# Patient Record
Sex: FEMALE | Race: BLACK OR AFRICAN AMERICAN | NOT HISPANIC OR LATINO | Employment: FULL TIME | URBAN - METROPOLITAN AREA
[De-identification: names, ages, dates, MRNs, and addresses within clinical notes are randomized per-mention and may not be internally consistent; named-entity substitution may affect disease eponyms.]

---

## 2020-05-13 ENCOUNTER — OFFICE VISIT (OUTPATIENT)
Dept: FAMILY MEDICINE CLINIC | Facility: CLINIC | Age: 30
End: 2020-05-13
Payer: COMMERCIAL

## 2020-05-13 VITALS
TEMPERATURE: 98.3 F | DIASTOLIC BLOOD PRESSURE: 84 MMHG | WEIGHT: 256 LBS | OXYGEN SATURATION: 98 % | SYSTOLIC BLOOD PRESSURE: 132 MMHG | BODY MASS INDEX: 43.71 KG/M2 | HEIGHT: 64 IN | HEART RATE: 58 BPM

## 2020-05-13 DIAGNOSIS — N92.6 ABNORMAL MENSTRUAL PERIODS: Primary | ICD-10-CM

## 2020-05-13 DIAGNOSIS — Z13.220 ENCOUNTER FOR SCREENING FOR LIPID DISORDER: ICD-10-CM

## 2020-05-13 DIAGNOSIS — Z13.29 SCREENING FOR THYROID DISORDER: ICD-10-CM

## 2020-05-13 DIAGNOSIS — Z13.1 ENCOUNTER FOR SCREENING FOR DIABETES MELLITUS: ICD-10-CM

## 2020-05-13 DIAGNOSIS — Z13.6 SCREENING FOR HYPERTENSION: ICD-10-CM

## 2020-05-13 DIAGNOSIS — Z13.0 SCREENING FOR IRON DEFICIENCY ANEMIA: ICD-10-CM

## 2020-05-13 DIAGNOSIS — E66.01 MORBID OBESITY WITH BMI OF 40.0-44.9, ADULT (HCC): ICD-10-CM

## 2020-05-13 PROCEDURE — 99203 OFFICE O/P NEW LOW 30 MIN: CPT | Performed by: FAMILY MEDICINE

## 2020-05-13 PROCEDURE — 3008F BODY MASS INDEX DOCD: CPT | Performed by: FAMILY MEDICINE

## 2020-05-13 PROCEDURE — 1036F TOBACCO NON-USER: CPT | Performed by: FAMILY MEDICINE

## 2020-05-26 ENCOUNTER — HOSPITAL ENCOUNTER (OUTPATIENT)
Dept: ULTRASOUND IMAGING | Facility: HOSPITAL | Age: 30
Discharge: HOME | End: 2020-05-26
Payer: COMMERCIAL

## 2020-05-26 DIAGNOSIS — N92.6 ABNORMAL MENSTRUAL PERIODS: ICD-10-CM

## 2020-05-26 PROCEDURE — 76856 US EXAM PELVIC COMPLETE: CPT

## 2020-05-26 PROCEDURE — 76830 TRANSVAGINAL US NON-OB: CPT

## 2020-06-03 ENCOUNTER — APPOINTMENT (OUTPATIENT)
Dept: LAB | Facility: HOSPITAL | Age: 30
End: 2020-06-03
Payer: COMMERCIAL

## 2020-06-03 DIAGNOSIS — Z13.29 SCREENING FOR THYROID DISORDER: ICD-10-CM

## 2020-06-03 DIAGNOSIS — Z13.6 SCREENING FOR HYPERTENSION: ICD-10-CM

## 2020-06-03 DIAGNOSIS — Z13.1 ENCOUNTER FOR SCREENING FOR DIABETES MELLITUS: ICD-10-CM

## 2020-06-03 DIAGNOSIS — E66.01 MORBID OBESITY WITH BMI OF 40.0-44.9, ADULT (HCC): ICD-10-CM

## 2020-06-03 DIAGNOSIS — Z13.0 SCREENING FOR IRON DEFICIENCY ANEMIA: ICD-10-CM

## 2020-06-03 DIAGNOSIS — N92.6 ABNORMAL MENSTRUAL PERIODS: ICD-10-CM

## 2020-06-03 DIAGNOSIS — Z13.220 ENCOUNTER FOR SCREENING FOR LIPID DISORDER: ICD-10-CM

## 2020-06-03 LAB
ALBUMIN SERPL BCP-MCNC: 3.6 G/DL (ref 3–5.2)
ALP SERPL-CCNC: 75 U/L (ref 43–122)
ALT SERPL W P-5'-P-CCNC: 17 U/L (ref 9–52)
ANION GAP SERPL CALCULATED.3IONS-SCNC: 9 MMOL/L (ref 5–14)
AST SERPL W P-5'-P-CCNC: 20 U/L (ref 14–36)
B-HCG SERPL-ACNC: <3 MIU/ML
BASOPHILS # BLD AUTO: 0 THOUSANDS/ΜL (ref 0–0.1)
BASOPHILS NFR BLD AUTO: 0 % (ref 0–1)
BILIRUB SERPL-MCNC: 0.4 MG/DL
BUN SERPL-MCNC: 13 MG/DL (ref 5–25)
CALCIUM SERPL-MCNC: 9.5 MG/DL (ref 8.4–10.2)
CHLORIDE SERPL-SCNC: 103 MMOL/L (ref 97–108)
CHOLEST SERPL-MCNC: 192 MG/DL
CO2 SERPL-SCNC: 26 MMOL/L (ref 22–30)
CREAT SERPL-MCNC: 0.79 MG/DL (ref 0.6–1.2)
EOSINOPHIL # BLD AUTO: 0.1 THOUSAND/ΜL (ref 0–0.4)
EOSINOPHIL NFR BLD AUTO: 3 % (ref 0–6)
ERYTHROCYTE [DISTWIDTH] IN BLOOD BY AUTOMATED COUNT: 16.7 %
FSH SERPL-ACNC: 4.3 MIU/ML
GFR SERPL CREATININE-BSD FRML MDRD: 115 ML/MIN/1.73SQ M
GLUCOSE P FAST SERPL-MCNC: 91 MG/DL (ref 70–99)
HCT VFR BLD AUTO: 37.4 % (ref 36–46)
HDLC SERPL-MCNC: 47 MG/DL
HGB BLD-MCNC: 12.4 G/DL (ref 12–16)
LDLC SERPL CALC-MCNC: 118 MG/DL
LH SERPL-ACNC: 2.6 MIU/ML
LYMPHOCYTES # BLD AUTO: 1.9 THOUSANDS/ΜL (ref 0.5–4)
LYMPHOCYTES NFR BLD AUTO: 34 % (ref 25–45)
MCH RBC QN AUTO: 27.7 PG (ref 26–34)
MCHC RBC AUTO-ENTMCNC: 33 G/DL (ref 31–36)
MCV RBC AUTO: 84 FL (ref 80–100)
MONOCYTES # BLD AUTO: 0.4 THOUSAND/ΜL (ref 0.2–0.9)
MONOCYTES NFR BLD AUTO: 7 % (ref 1–10)
NEUTROPHILS # BLD AUTO: 3.2 THOUSANDS/ΜL (ref 1.8–7.8)
NEUTS SEG NFR BLD AUTO: 57 % (ref 45–65)
PLATELET # BLD AUTO: 280 THOUSANDS/UL (ref 150–450)
PMV BLD AUTO: 8.8 FL (ref 8.9–12.7)
POTASSIUM SERPL-SCNC: 4.6 MMOL/L (ref 3.6–5)
PROLACTIN SERPL-MCNC: 12.5 NG/ML
PROT SERPL-MCNC: 6.8 G/DL (ref 5.9–8.4)
RBC # BLD AUTO: 4.47 MILLION/UL (ref 4–5.2)
SODIUM SERPL-SCNC: 138 MMOL/L (ref 137–147)
TESTOST SERPL-MCNC: 30 NG/DL
TRIGL SERPL-MCNC: 135 MG/DL
TSH SERPL DL<=0.05 MIU/L-ACNC: 2.43 UIU/ML (ref 0.47–4.68)
WBC # BLD AUTO: 5.7 THOUSAND/UL (ref 4.5–11)

## 2020-06-03 PROCEDURE — 83002 ASSAY OF GONADOTROPIN (LH): CPT

## 2020-06-03 PROCEDURE — 80061 LIPID PANEL: CPT

## 2020-06-03 PROCEDURE — 84146 ASSAY OF PROLACTIN: CPT

## 2020-06-03 PROCEDURE — 83525 ASSAY OF INSULIN: CPT

## 2020-06-03 PROCEDURE — 84403 ASSAY OF TOTAL TESTOSTERONE: CPT

## 2020-06-03 PROCEDURE — 84702 CHORIONIC GONADOTROPIN TEST: CPT

## 2020-06-03 PROCEDURE — 83498 ASY HYDROXYPROGESTERONE 17-D: CPT

## 2020-06-03 PROCEDURE — 84443 ASSAY THYROID STIM HORMONE: CPT

## 2020-06-03 PROCEDURE — 36415 COLL VENOUS BLD VENIPUNCTURE: CPT

## 2020-06-03 PROCEDURE — 85025 COMPLETE CBC W/AUTO DIFF WBC: CPT

## 2020-06-03 PROCEDURE — 83001 ASSAY OF GONADOTROPIN (FSH): CPT

## 2020-06-03 PROCEDURE — 80053 COMPREHEN METABOLIC PANEL: CPT

## 2020-06-04 LAB — INSULIN SERPL-ACNC: 17.2 MU/L (ref 3–25)

## 2020-06-06 LAB — 17OHP SERPL-MCNC: 26 NG/DL

## 2020-06-08 ENCOUNTER — OFFICE VISIT (OUTPATIENT)
Dept: FAMILY MEDICINE CLINIC | Facility: CLINIC | Age: 30
End: 2020-06-08
Payer: COMMERCIAL

## 2020-06-08 VITALS
BODY MASS INDEX: 42.34 KG/M2 | RESPIRATION RATE: 16 BRPM | TEMPERATURE: 98 F | SYSTOLIC BLOOD PRESSURE: 134 MMHG | DIASTOLIC BLOOD PRESSURE: 78 MMHG | HEART RATE: 64 BPM | WEIGHT: 248 LBS | HEIGHT: 64 IN | OXYGEN SATURATION: 99 %

## 2020-06-08 DIAGNOSIS — R93.89 ABNORMAL TRANSVAGINAL ULTRASOUND: ICD-10-CM

## 2020-06-08 DIAGNOSIS — Z00.01 ENCOUNTER FOR WELL ADULT EXAM WITH ABNORMAL FINDINGS: Primary | ICD-10-CM

## 2020-06-08 DIAGNOSIS — N92.6 ABNORMAL MENSTRUAL PERIODS: ICD-10-CM

## 2020-06-08 DIAGNOSIS — E66.01 MORBID OBESITY WITH BMI OF 40.0-44.9, ADULT (HCC): ICD-10-CM

## 2020-06-08 DIAGNOSIS — N83.202 CYSTS OF BOTH OVARIES: ICD-10-CM

## 2020-06-08 DIAGNOSIS — N83.201 CYSTS OF BOTH OVARIES: ICD-10-CM

## 2020-06-08 PROCEDURE — 99395 PREV VISIT EST AGE 18-39: CPT | Performed by: FAMILY MEDICINE

## 2020-06-08 RX ORDER — MEDROXYPROGESTERONE ACETATE 10 MG/1
10 TABLET ORAL DAILY
Qty: 5 TABLET | Refills: 0 | Status: SHIPPED | OUTPATIENT
Start: 2020-06-08 | End: 2020-06-16 | Stop reason: HOSPADM

## 2020-06-09 ENCOUNTER — TELEPHONE (OUTPATIENT)
Dept: OBGYN CLINIC | Facility: CLINIC | Age: 30
End: 2020-06-09

## 2020-06-10 ENCOUNTER — OFFICE VISIT (OUTPATIENT)
Dept: OBGYN CLINIC | Facility: CLINIC | Age: 30
End: 2020-06-10
Payer: COMMERCIAL

## 2020-06-10 VITALS
BODY MASS INDEX: 42.99 KG/M2 | SYSTOLIC BLOOD PRESSURE: 132 MMHG | WEIGHT: 251.8 LBS | HEART RATE: 73 BPM | HEIGHT: 64 IN | TEMPERATURE: 99 F | DIASTOLIC BLOOD PRESSURE: 84 MMHG

## 2020-06-10 DIAGNOSIS — R93.89 ENDOMETRIAL THICKENING ON ULTRASOUND: ICD-10-CM

## 2020-06-10 DIAGNOSIS — N93.9 ABNORMAL UTERINE BLEEDING (AUB): Primary | ICD-10-CM

## 2020-06-10 PROCEDURE — 3008F BODY MASS INDEX DOCD: CPT | Performed by: OBSTETRICS & GYNECOLOGY

## 2020-06-10 PROCEDURE — 1036F TOBACCO NON-USER: CPT | Performed by: OBSTETRICS & GYNECOLOGY

## 2020-06-10 PROCEDURE — 99214 OFFICE O/P EST MOD 30 MIN: CPT | Performed by: OBSTETRICS & GYNECOLOGY

## 2020-06-10 RX ORDER — SODIUM CHLORIDE, SODIUM LACTATE, POTASSIUM CHLORIDE, CALCIUM CHLORIDE 600; 310; 30; 20 MG/100ML; MG/100ML; MG/100ML; MG/100ML
125 INJECTION, SOLUTION INTRAVENOUS CONTINUOUS
Status: CANCELLED | OUTPATIENT
Start: 2020-06-16

## 2020-06-12 DIAGNOSIS — N93.9 ABNORMAL UTERINE BLEEDING (AUB): ICD-10-CM

## 2020-06-12 DIAGNOSIS — R93.89 ENDOMETRIAL THICKENING ON ULTRASOUND: ICD-10-CM

## 2020-06-12 PROCEDURE — U0003 INFECTIOUS AGENT DETECTION BY NUCLEIC ACID (DNA OR RNA); SEVERE ACUTE RESPIRATORY SYNDROME CORONAVIRUS 2 (SARS-COV-2) (CORONAVIRUS DISEASE [COVID-19]), AMPLIFIED PROBE TECHNIQUE, MAKING USE OF HIGH THROUGHPUT TECHNOLOGIES AS DESCRIBED BY CMS-2020-01-R: HCPCS

## 2020-06-13 LAB — SARS-COV-2 RNA SPEC QL NAA+PROBE: NOT DETECTED

## 2020-06-15 ENCOUNTER — ANESTHESIA EVENT (OUTPATIENT)
Dept: PERIOP | Facility: HOSPITAL | Age: 30
End: 2020-06-15
Payer: COMMERCIAL

## 2020-06-16 ENCOUNTER — HOSPITAL ENCOUNTER (OUTPATIENT)
Facility: HOSPITAL | Age: 30
Setting detail: OUTPATIENT SURGERY
Discharge: HOME | End: 2020-06-16
Attending: OBSTETRICS & GYNECOLOGY | Admitting: OBSTETRICS & GYNECOLOGY
Payer: COMMERCIAL

## 2020-06-16 ENCOUNTER — ANESTHESIA (OUTPATIENT)
Dept: PERIOP | Facility: HOSPITAL | Age: 30
End: 2020-06-16
Payer: COMMERCIAL

## 2020-06-16 VITALS
RESPIRATION RATE: 19 BRPM | SYSTOLIC BLOOD PRESSURE: 156 MMHG | HEIGHT: 63 IN | TEMPERATURE: 98.2 F | BODY MASS INDEX: 44.47 KG/M2 | DIASTOLIC BLOOD PRESSURE: 84 MMHG | HEART RATE: 54 BPM | WEIGHT: 251 LBS | OXYGEN SATURATION: 98 %

## 2020-06-16 DIAGNOSIS — N93.9 ABNORMAL UTERINE BLEEDING (AUB): ICD-10-CM

## 2020-06-16 DIAGNOSIS — R93.89 ENDOMETRIAL THICKENING ON ULTRASOUND: ICD-10-CM

## 2020-06-16 DIAGNOSIS — Z98.890 STATUS POST HYSTEROSCOPY: Primary | ICD-10-CM

## 2020-06-16 DIAGNOSIS — Z98.890 S/P DILATATION AND CURETTAGE: ICD-10-CM

## 2020-06-16 LAB
EXT PREGNANCY TEST URINE: NEGATIVE
EXT. CONTROL: NORMAL

## 2020-06-16 PROCEDURE — 81025 URINE PREGNANCY TEST: CPT | Performed by: OBSTETRICS & GYNECOLOGY

## 2020-06-16 PROCEDURE — 58558 HYSTEROSCOPY BIOPSY: CPT | Performed by: OBSTETRICS & GYNECOLOGY

## 2020-06-16 PROCEDURE — 88305 TISSUE EXAM BY PATHOLOGIST: CPT | Performed by: PATHOLOGY

## 2020-06-16 RX ORDER — SODIUM CHLORIDE 9 MG/ML
125 INJECTION, SOLUTION INTRAVENOUS CONTINUOUS
Status: DISCONTINUED | OUTPATIENT
Start: 2020-06-16 | End: 2020-06-16 | Stop reason: HOSPADM

## 2020-06-16 RX ORDER — OXYCODONE HYDROCHLORIDE AND ACETAMINOPHEN 5; 325 MG/1; MG/1
2 TABLET ORAL EVERY 4 HOURS PRN
Status: DISCONTINUED | OUTPATIENT
Start: 2020-06-16 | End: 2020-06-16 | Stop reason: HOSPADM

## 2020-06-16 RX ORDER — HYDROMORPHONE HCL/PF 1 MG/ML
0.5 SYRINGE (ML) INJECTION
Status: DISCONTINUED | OUTPATIENT
Start: 2020-06-16 | End: 2020-06-16 | Stop reason: HOSPADM

## 2020-06-16 RX ORDER — KETOROLAC TROMETHAMINE 30 MG/ML
INJECTION, SOLUTION INTRAMUSCULAR; INTRAVENOUS AS NEEDED
Status: DISCONTINUED | OUTPATIENT
Start: 2020-06-16 | End: 2020-06-16 | Stop reason: SURG

## 2020-06-16 RX ORDER — FENTANYL CITRATE 50 UG/ML
INJECTION, SOLUTION INTRAMUSCULAR; INTRAVENOUS AS NEEDED
Status: DISCONTINUED | OUTPATIENT
Start: 2020-06-16 | End: 2020-06-16 | Stop reason: SURG

## 2020-06-16 RX ORDER — EPHEDRINE SULFATE 50 MG/ML
INJECTION INTRAVENOUS AS NEEDED
Status: DISCONTINUED | OUTPATIENT
Start: 2020-06-16 | End: 2020-06-16 | Stop reason: SURG

## 2020-06-16 RX ORDER — IBUPROFEN 600 MG/1
600 TABLET ORAL EVERY 6 HOURS PRN
Status: DISCONTINUED | OUTPATIENT
Start: 2020-06-16 | End: 2020-06-16 | Stop reason: HOSPADM

## 2020-06-16 RX ORDER — OXYCODONE HYDROCHLORIDE AND ACETAMINOPHEN 5; 325 MG/1; MG/1
1 TABLET ORAL EVERY 4 HOURS PRN
Status: DISCONTINUED | OUTPATIENT
Start: 2020-06-16 | End: 2020-06-16 | Stop reason: HOSPADM

## 2020-06-16 RX ORDER — IBUPROFEN 600 MG/1
600 TABLET ORAL EVERY 6 HOURS PRN
Qty: 40 TABLET | Refills: 0 | Status: SHIPPED | OUTPATIENT
Start: 2020-06-16 | End: 2020-10-09 | Stop reason: ALTCHOICE

## 2020-06-16 RX ORDER — PROPOFOL 10 MG/ML
INJECTION, EMULSION INTRAVENOUS AS NEEDED
Status: DISCONTINUED | OUTPATIENT
Start: 2020-06-16 | End: 2020-06-16 | Stop reason: SURG

## 2020-06-16 RX ORDER — MIDAZOLAM HYDROCHLORIDE 2 MG/2ML
INJECTION, SOLUTION INTRAMUSCULAR; INTRAVENOUS AS NEEDED
Status: DISCONTINUED | OUTPATIENT
Start: 2020-06-16 | End: 2020-06-16 | Stop reason: SURG

## 2020-06-16 RX ORDER — SODIUM CHLORIDE, SODIUM LACTATE, POTASSIUM CHLORIDE, CALCIUM CHLORIDE 600; 310; 30; 20 MG/100ML; MG/100ML; MG/100ML; MG/100ML
125 INJECTION, SOLUTION INTRAVENOUS CONTINUOUS
Status: DISCONTINUED | OUTPATIENT
Start: 2020-06-16 | End: 2020-06-16 | Stop reason: HOSPADM

## 2020-06-16 RX ORDER — ONDANSETRON 2 MG/ML
INJECTION INTRAMUSCULAR; INTRAVENOUS AS NEEDED
Status: DISCONTINUED | OUTPATIENT
Start: 2020-06-16 | End: 2020-06-16 | Stop reason: SURG

## 2020-06-16 RX ORDER — LIDOCAINE HYDROCHLORIDE 20 MG/ML
INJECTION, SOLUTION EPIDURAL; INFILTRATION; INTRACAUDAL; PERINEURAL AS NEEDED
Status: DISCONTINUED | OUTPATIENT
Start: 2020-06-16 | End: 2020-06-16 | Stop reason: SURG

## 2020-06-16 RX ORDER — DEXAMETHASONE SODIUM PHOSPHATE 4 MG/ML
INJECTION, SOLUTION INTRA-ARTICULAR; INTRALESIONAL; INTRAMUSCULAR; INTRAVENOUS; SOFT TISSUE AS NEEDED
Status: DISCONTINUED | OUTPATIENT
Start: 2020-06-16 | End: 2020-06-16 | Stop reason: SURG

## 2020-06-16 RX ORDER — MAGNESIUM HYDROXIDE 1200 MG/15ML
LIQUID ORAL AS NEEDED
Status: DISCONTINUED | OUTPATIENT
Start: 2020-06-16 | End: 2020-06-16 | Stop reason: HOSPADM

## 2020-06-16 RX ADMIN — MIDAZOLAM 2 MG: 1 INJECTION INTRAMUSCULAR; INTRAVENOUS at 15:02

## 2020-06-16 RX ADMIN — SODIUM CHLORIDE, SODIUM LACTATE, POTASSIUM CHLORIDE, AND CALCIUM CHLORIDE: .6; .31; .03; .02 INJECTION, SOLUTION INTRAVENOUS at 16:03

## 2020-06-16 RX ADMIN — SODIUM CHLORIDE, SODIUM LACTATE, POTASSIUM CHLORIDE, AND CALCIUM CHLORIDE 125 ML/HR: .6; .31; .03; .02 INJECTION, SOLUTION INTRAVENOUS at 13:25

## 2020-06-16 RX ADMIN — EPHEDRINE SULFATE 5 MG: 50 INJECTION, SOLUTION INTRAVENOUS at 15:36

## 2020-06-16 RX ADMIN — FENTANYL CITRATE 25 MCG: 50 INJECTION, SOLUTION INTRAMUSCULAR; INTRAVENOUS at 15:11

## 2020-06-16 RX ADMIN — EPHEDRINE SULFATE 5 MG: 50 INJECTION, SOLUTION INTRAVENOUS at 15:45

## 2020-06-16 RX ADMIN — FENTANYL CITRATE 25 MCG: 50 INJECTION, SOLUTION INTRAMUSCULAR; INTRAVENOUS at 15:23

## 2020-06-16 RX ADMIN — FENTANYL CITRATE 25 MCG: 50 INJECTION, SOLUTION INTRAMUSCULAR; INTRAVENOUS at 15:19

## 2020-06-16 RX ADMIN — ONDANSETRON 4 MG: 2 INJECTION INTRAMUSCULAR; INTRAVENOUS at 15:32

## 2020-06-16 RX ADMIN — PROPOFOL 200 MG: 10 INJECTION, EMULSION INTRAVENOUS at 15:11

## 2020-06-16 RX ADMIN — FENTANYL CITRATE 25 MCG: 50 INJECTION, SOLUTION INTRAMUSCULAR; INTRAVENOUS at 15:15

## 2020-06-16 RX ADMIN — LIDOCAINE HYDROCHLORIDE 100 MG: 20 INJECTION, SOLUTION EPIDURAL; INFILTRATION; INTRACAUDAL; PERINEURAL at 15:11

## 2020-06-16 RX ADMIN — FENTANYL CITRATE 50 MCG: 50 INJECTION, SOLUTION INTRAMUSCULAR; INTRAVENOUS at 15:26

## 2020-06-16 RX ADMIN — DEXAMETHASONE SODIUM PHOSPHATE 4 MG: 4 INJECTION, SOLUTION INTRAMUSCULAR; INTRAVENOUS at 15:32

## 2020-06-16 RX ADMIN — SODIUM CHLORIDE, SODIUM LACTATE, POTASSIUM CHLORIDE, AND CALCIUM CHLORIDE: .6; .31; .03; .02 INJECTION, SOLUTION INTRAVENOUS at 15:02

## 2020-06-16 RX ADMIN — HYDROMORPHONE HYDROCHLORIDE 0.5 MG: 1 INJECTION, SOLUTION INTRAMUSCULAR; INTRAVENOUS; SUBCUTANEOUS at 16:20

## 2020-06-16 RX ADMIN — KETOROLAC TROMETHAMINE 30 MG: 30 INJECTION, SOLUTION INTRAMUSCULAR at 15:53

## 2020-06-30 ENCOUNTER — OFFICE VISIT (OUTPATIENT)
Dept: OBGYN CLINIC | Facility: CLINIC | Age: 30
End: 2020-06-30
Payer: COMMERCIAL

## 2020-06-30 VITALS
HEIGHT: 63 IN | DIASTOLIC BLOOD PRESSURE: 76 MMHG | WEIGHT: 252.4 LBS | TEMPERATURE: 99.8 F | HEART RATE: 65 BPM | SYSTOLIC BLOOD PRESSURE: 128 MMHG | BODY MASS INDEX: 44.72 KG/M2

## 2020-06-30 DIAGNOSIS — Z98.890 STATUS POST HYSTEROSCOPY: Primary | ICD-10-CM

## 2020-06-30 DIAGNOSIS — Z98.890 S/P DILATATION AND CURETTAGE: ICD-10-CM

## 2020-06-30 DIAGNOSIS — N93.9 ABNORMAL UTERINE BLEEDING (AUB): ICD-10-CM

## 2020-06-30 DIAGNOSIS — N94.6 DYSMENORRHEA: ICD-10-CM

## 2020-06-30 PROCEDURE — 1036F TOBACCO NON-USER: CPT | Performed by: OBSTETRICS & GYNECOLOGY

## 2020-06-30 PROCEDURE — 3008F BODY MASS INDEX DOCD: CPT | Performed by: OBSTETRICS & GYNECOLOGY

## 2020-06-30 PROCEDURE — 99213 OFFICE O/P EST LOW 20 MIN: CPT | Performed by: OBSTETRICS & GYNECOLOGY

## 2020-06-30 RX ORDER — HYDROCODONE BITARTRATE AND ACETAMINOPHEN 5; 325 MG/1; MG/1
1 TABLET ORAL EVERY 6 HOURS PRN
Qty: 20 TABLET | Refills: 0 | Status: SHIPPED | OUTPATIENT
Start: 2020-06-30 | End: 2020-10-09 | Stop reason: ALTCHOICE

## 2020-07-14 ENCOUNTER — OFFICE VISIT (OUTPATIENT)
Dept: OBGYN CLINIC | Facility: CLINIC | Age: 30
End: 2020-07-14
Payer: COMMERCIAL

## 2020-07-14 VITALS
BODY MASS INDEX: 44.3 KG/M2 | HEART RATE: 54 BPM | TEMPERATURE: 98.6 F | HEIGHT: 63 IN | SYSTOLIC BLOOD PRESSURE: 122 MMHG | WEIGHT: 250 LBS | DIASTOLIC BLOOD PRESSURE: 76 MMHG

## 2020-07-14 DIAGNOSIS — N83.202 CYSTS OF BOTH OVARIES: ICD-10-CM

## 2020-07-14 DIAGNOSIS — N94.6 DYSMENORRHEA: ICD-10-CM

## 2020-07-14 DIAGNOSIS — N83.201 CYSTS OF BOTH OVARIES: ICD-10-CM

## 2020-07-14 DIAGNOSIS — Z98.890 S/P DILATATION AND CURETTAGE: ICD-10-CM

## 2020-07-14 DIAGNOSIS — N93.9 ABNORMAL UTERINE BLEEDING (AUB): Primary | ICD-10-CM

## 2020-07-14 PROBLEM — N92.6 ABNORMAL MENSTRUAL PERIODS: Status: RESOLVED | Noted: 2020-05-13 | Resolved: 2020-07-14

## 2020-07-14 PROBLEM — R93.89 ABNORMAL TRANSVAGINAL ULTRASOUND: Status: RESOLVED | Noted: 2020-06-08 | Resolved: 2020-07-14

## 2020-07-14 PROBLEM — Z00.01 ENCOUNTER FOR WELL ADULT EXAM WITH ABNORMAL FINDINGS: Status: RESOLVED | Noted: 2020-06-08 | Resolved: 2020-07-14

## 2020-07-14 PROCEDURE — 99213 OFFICE O/P EST LOW 20 MIN: CPT | Performed by: OBSTETRICS & GYNECOLOGY

## 2020-07-14 PROCEDURE — 3008F BODY MASS INDEX DOCD: CPT | Performed by: OBSTETRICS & GYNECOLOGY

## 2020-07-14 PROCEDURE — 1036F TOBACCO NON-USER: CPT | Performed by: OBSTETRICS & GYNECOLOGY

## 2020-07-14 NOTE — PROGRESS NOTES
Assessment/Plan:     Diagnoses and all orders for this visit:    Abnormal uterine bleeding (AUB)  -     US pelvis complete w transvaginal; Future    Cysts of both ovaries  -     US pelvis complete w transvaginal; Future    Dysmenorrhea  -     US pelvis complete w transvaginal; Future    S/P dilatation and curettage          Exam is normal with no bleeding noted  Advised to continue progestin therapy, norethindrone acetate 15 mg/day  Repeat US was ordered for 3 weeks to check ovarian cysts, endometrial lining  Discussed options to treat AUB including LNG-IUD, Depo provera, endometrial ablation vs hysterectomy  Advised menstrual calendar  Follow-up in 1 month,      Subjective   Patient ID: Saud Longoria is a 31 y o  female  Patient is here for a problem visit  Chief Complaint   Patient presents with    Follow-up     re-check    Vaginal Pain    Vaginal Bleeding     She has a h/o Hysteroscopy and D and C, polypectomy for AUB and thickened endometrium on 2020  She is maintained on norethindrone acetate 15 mg daily  Pathology is benign and is suggestive of endometrial polyp  Patient with passage of large clot in   She is having on and off cramping  She is using about 1-4 pads per day, 4 pads per day on a heavy day        Menstrual History:  OB History        2    Para   1    Term   1       0    AB   1    Living   1       SAB   0    TAB   1    Ectopic   0    Multiple   0    Live Births   1                       Past Medical History:   Diagnosis Date    Abdominal pain     "sharp at times"    Heavy menses     Hypertension     not on meds    Irregular menses     Migraines     Moderate exercise     'stands and walks alot at work"    Obesity     Teeth missing        Past Surgical History:   Procedure Laterality Date     SECTION      HERNIA REPAIR      "age 5 approx"    POLYPECTOMY N/A 2020    Procedure: POLYPECTOMY;  Surgeon: Tiarra Lin MD; Location: [facility];  Service: Gynecology    [place] HYSTEROSCOPY,W/ENDO BX N/A 6/16/2020    Procedure: DILATATION AND CURETTAGE (D&C) WITH HYSTEROSCOPY;  Surgeon: Nneak Zazueta MD;  Location: [facility];  Service: Gynecology       Allergies   Allergen Reactions    Avocado Throat Swelling    Latex Itching and Swelling     "with condoms"    Eggs Or Egg-Derived Products Itching     Eggs if bought at a fast food place/regular hard boiled can eat at home    Watermelon [Citrullus Vulgaris] Itching     Of throat         Current Outpatient Medications:     HYDROcodone-acetaminophen (NORCO) 5-325 mg per tablet, Take 1 tablet by mouth every 6 (six) hours as needed for painMax Daily Amount: 4 tablets, Disp: 20 tablet, Rfl: 0    ibuprofen (MOTRIN) 600 mg tablet, Take 1 tablet (600 mg total) by mouth every 6 (six) hours as needed for moderate pain, Disp: 40 tablet, Rfl: 0    norethindrone (AYGESTIN) 5 mg tablet, Take 1 tablet (5 mg total) by mouth daily (Patient taking differently: Take 5 mg by mouth daily at bedtime ), Disp: 60 tablet, Rfl: 6    norethindrone (AYGESTIN) 5 mg tablet, Take 4 tablets (20 mg total) by mouth daily for 4 days, THEN 3 tablets (15 mg total) daily for 3 days, THEN 2 tablets (10 mg total) daily  , Disp: 85 tablet, Rfl: 1    TURMERIC PO, Take by mouth daily, Disp: , Rfl:       Review of Systems   Constitutional: Negative  HENT: Negative  Eyes: Negative  Respiratory: Negative  Cardiovascular: Negative  Gastrointestinal: Negative  Endocrine: Negative  Genitourinary:        As noted in HPI   Musculoskeletal: Negative  Skin: Negative  Allergic/Immunologic: Negative  Neurological: Negative  Hematological: Negative  Psychiatric/Behavioral: Negative            /76 (BP Location: Right arm, Patient Position: Sitting, Cuff Size: Standard)   Pulse (!) 54   Temp 98 6 °F (37 °C) (Tympanic)   Ht 5' 3" (1 6 m)   Wt 113 kg (250 lb)   BMI 44 29 kg/m²       Physical Exam Constitutional: She is oriented to person, place, and time  She appears well-developed and well-nourished  No distress  Genitourinary: Uterus normal  Pelvic exam was performed with patient supine  There is no rash, tenderness, lesion or injury on the right labia  There is no rash, tenderness, lesion or injury on the left labia  No erythema, tenderness or bleeding in the vagina  No signs of injury around the vagina  No vaginal discharge found  Right adnexum does not display mass, does not display tenderness and does not display fullness  Left adnexum does not display mass, does not display tenderness and does not display fullness  Cervix does not exhibit motion tenderness, lesion, discharge or polyp  Uterus is not enlarged or tender  Abdominal: Soft  She exhibits no distension  There is no tenderness  Neurological: She is alert and oriented to person, place, and time  Skin: Skin is warm and dry  Psychiatric: She has a normal mood and affect   Her behavior is normal

## 2020-07-14 NOTE — PATIENT INSTRUCTIONS
Norethindrone (By mouth)   Norethindrone (nor-ETH-in-drone)  Prevents pregnancy  Also treats menstrual problems and endometriosis  This medicine is commonly called a birth control pill  Brand Name(s): Aygestin, Leny, Deblitane, Jasmyn, ORLÉANS, [place], Jolivette, [place]eta Pack, Steve, Nor-QD, Blessing-BE, Norlyroc, Ortho Micronor, Sharobel   There may be other brand names for this medicine  When This Medicine Should Not Be Used: This medicine is not right for everyone  Do not use it if you had an allergic reaction to a progestin drug or if you are pregnant  Do not use it if you have liver disease, liver tumors, or a history of blood clots, stroke, heart attack, or breast cancer  How to Use This Medicine:   Tablet  · Your doctor will tell you how much medicine to use  Do not use more than directed  Different brands of birth control pills have different instructions for when to start  Ask your doctor or pharmacist if you do not understand what day to start taking your brand  · You may take this medicine with food or milk if it upsets your stomach  · Keep your pills in the container you receive from the pharmacy  Take the pills in the order they appear in the container  · Read and follow the patient instructions that come with this medicine  Talk to your doctor or pharmacist if you have any questions  · Take your pill at the same time every day, even during your menstrual period  · Take a dose as soon as you remember  If it is almost time for your next dose, wait until then and take a regular dose  Do not take extra medicine to make up for a missed dose  If you take a pill more than 3 hours late, use another form of birth control for the next 48 hours  · Store the pills in the original package, away from heat, moisture, and direct light  Drugs and Foods to Avoid:   Ask your doctor or pharmacist before using any other medicine, including over-the-counter medicines, vitamins, and herbal products    · Some foods and medicines can affect how birth control pills work  Tell your doctor if you are also taking any of the following:  ¨ Bromocriptine, rifampin, Juan J's wort, bosentan, griseofulvin  ¨ Antibiotic  ¨ Seizure medicine, including phenytoin, carbamazepine, felbamate, topiramate  ¨ Protease inhibitor that treats HIV/AIDS  ¨ Barbiturate (used to treat seizures, anxiety, or insomnia)  Warnings While Using This Medicine:   · Tell your doctor right away if you think you have become pregnant  If you miss two periods in a row, call your doctor for a pregnancy test before you take any more pills  · Tell your doctor if you are breastfeeding or if you have kidney disease, lupus, high blood pressure, high cholesterol, epilepsy, asthma, migraine headaches, diabetes, or a history of depression  · This medicine may cause the following problems:  ¨ Ectopic (tubal) pregnancy  ¨ Ovarian cysts that might twist or break  ¨ Possible risk of breast cancer  ¨ Benign liver tumor  ¨ Blood clots, which may lead to stroke or heart attack  · You might have spotting or irregular bleeding when you first start to use this medicine  You might have unplanned bleeding if you miss a dose or are late taking it  Call your doctor if you think there is a problem, such as if you have heavy bleeding  · This medicine will not protect you from HIV/AIDS or other sexually transmitted diseases  · Use a second form of birth control during the first 3 weeks to make sure you are protected from pregnancy  · Smoking increases your risk of heart attack, stroke, or blood clot while using this medicine  Talk to your doctor about these risks  · Tell any doctor or dentist who treats you that you are using this medicine  This medicine may affect certain medical test results  · Keep all medicine out of the reach of children  Never share your medicine with anyone    Possible Side Effects While Using This Medicine:   Call your doctor right away if you notice any of these side effects:  · Allergic reaction: Itching or hives, swelling in your face or hands, swelling or tingling in your mouth or throat, chest tightness, trouble breathing  · Chest pain, trouble breathing, or coughing up blood  · Numbness or weakness on one side of your body, sudden or severe headache, problems with vision, speech, or walking  · Pain in your lower abdomen  · Severe headache, sensitivity to light or sound, nausea or vomiting  · Trouble seeing, double vision, or other eye problems  · Yellow skin or eyes  If you notice these less serious side effects, talk with your doctor:   · Breast tenderness or swelling  · Headache  · Light spotting or bleeding between periods  · Nausea  If you notice other side effects that you think are caused by this medicine, tell your doctor  Call your doctor for medical advice about side effects  You may report side effects to FDA at 0-918-FDA-7750  © 2017 [address] [employer] Information is for End User's use only and may not be sold, redistributed or otherwise used for commercial purposes  The above information is an  only  It is not intended as medical advice for individual conditions or treatments  Talk to your doctor, nurse or pharmacist before following any medical regimen to see if it is safe and effective for you

## 2020-08-04 ENCOUNTER — HOSPITAL ENCOUNTER (OUTPATIENT)
Dept: ULTRASOUND IMAGING | Facility: HOSPITAL | Age: 30
Discharge: HOME | End: 2020-08-04
Attending: OBSTETRICS & GYNECOLOGY
Payer: COMMERCIAL

## 2020-08-04 DIAGNOSIS — N93.9 ABNORMAL UTERINE BLEEDING (AUB): ICD-10-CM

## 2020-08-04 DIAGNOSIS — N83.201 CYSTS OF BOTH OVARIES: ICD-10-CM

## 2020-08-04 DIAGNOSIS — N83.202 CYSTS OF BOTH OVARIES: ICD-10-CM

## 2020-08-04 DIAGNOSIS — N94.6 DYSMENORRHEA: ICD-10-CM

## 2020-08-04 PROCEDURE — 76856 US EXAM PELVIC COMPLETE: CPT

## 2020-08-04 PROCEDURE — 76830 TRANSVAGINAL US NON-OB: CPT

## 2020-08-12 NOTE — PROGRESS NOTES
Assessment/Plan:     Diagnoses and all orders for this visit:    Abnormal uterine bleeding (AUB)  -     medroxyPROGESTERone (DEPO-PROVERA) 150 mg/mL injection; Inject 1 mL (150 mg total) into a muscle every 3 (three) months  -     norethindrone (AYGESTIN) 5 mg tablet; Take 2 tablets (10 mg total) by mouth daily  -     medroxyPROGESTERone (DEPO-PROVERA) IM injection 150 mg    Cysts of both ovaries    Dysmenorrhea  -     medroxyPROGESTERone (DEPO-PROVERA) 150 mg/mL injection; Inject 1 mL (150 mg total) into a muscle every 3 (three) months  -     norethindrone (AYGESTIN) 5 mg tablet; Take 2 tablets (10 mg total) by mouth daily    Endometrial thickening on ultrasound          Pelvic ultrasound shows resolution of bilateral ovarian cysts  There is a 3 cm paraovarian cyst that was discussed with patient  Pelvic ultrasound still is consistent with adenomyosis with enlarged uterus with heterogenous appearance  Discussed with patient options to treat adenomyosis including endometrial ablation, hysterectomy, levonorgestrel Intrauterine device, continued hormonal treatment with progestin therapy versus Depo-Provera  Patient requesting Depo-Provera  She was counseled on side effects including weight gain and continued abnormal uterine bleeding  She was given Depo-Provera today and she was instructed to continue norethindrone acetate 10 mg p o  Daily for now until she stops bleeding  She was advised to follow up in 1 month in the office for medication check  Subjective   Patient ID: Romeo Baeza is a 31 y o  female  Patient is here for a follow-up  She continues to have abnormal uterine bleeding  She is having bleeding about 2 pads per day despite being on high-dose progestin a 15 mg norethindrone acetate  She is complaining of decreased appetite but no other untoward side effects      Menstrual History:  OB History        2    Para   1    Term   1       0    AB   1    Living   1       SAB 0    TAB   1    Ectopic   0    Multiple   0    Live Births   1                  No LMP recorded  Past Medical History:   Diagnosis Date    Abdominal pain     "sharp at times"    Heavy menses     Hypertension     not on meds    Irregular menses     Migraines     Moderate exercise     'stands and walks alot at work"    Obesity     Teeth missing        Past Surgical History:   Procedure Laterality Date    [address]      "age 5 approx"    POLYPECTOMY N/A 6/16/2020    Procedure: POLYPECTOMY;  Surgeon: Donal Guzman MD;  Location: [facility];  Service: Gynecology    [place] HYSTEROSCOPY,W/ENDO BX N/A 6/16/2020    Procedure: DILATATION AND CURETTAGE (D&C) WITH HYSTEROSCOPY;  Surgeon: Donal Guzman MD;  Location[facility];  Service: Gynecology       Allergies   Allergen Reactions    Avocado Throat Swelling    Latex Itching and Swelling     "with condoms"    Eggs Or Egg-Derived Products Itching     Eggs if bought at a fast food place/regular hard boiled can eat at home    Watermelon [Citrullus Vulgaris] Itching     Of throat         Current Outpatient Medications:     HYDROcodone-acetaminophen (NORCO) 5-325 mg per tablet, Take 1 tablet by mouth every 6 (six) hours as needed for painMax Daily Amount: 4 tablets, Disp: 20 tablet, Rfl: 0    ibuprofen (MOTRIN) 600 mg tablet, Take 1 tablet (600 mg total) by mouth every 6 (six) hours as needed for moderate pain, Disp: 40 tablet, Rfl: 0    norethindrone (AYGESTIN) 5 mg tablet, Take 1 tablet (5 mg total) by mouth daily (Patient taking differently: Take 5 mg by mouth daily at bedtime ), Disp: 60 tablet, Rfl: 6    TURMERIC PO, Take by mouth daily, Disp: , Rfl:     norethindrone (AYGESTIN) 5 mg tablet, Take 4 tablets (20 mg total) by mouth daily for 4 days, THEN 3 tablets (15 mg total) daily for 3 days, THEN 2 tablets (10 mg total) daily  , Disp: 85 tablet, Rfl: 1      Review of Systems   Constitutional: Negative  HENT: Negative  Eyes: Negative  Respiratory: Negative  Cardiovascular: Negative  Gastrointestinal: Negative  Endocrine: Negative  Genitourinary:        As noted in HPI   Musculoskeletal: Negative  Skin: Negative  Allergic/Immunologic: Negative  Neurological: Negative  Hematological: Negative  Psychiatric/Behavioral: Negative  /84 (BP Location: Right arm, Patient Position: Sitting, Cuff Size: Standard)   Pulse 60   Temp 98 8 °F (37 1 °C) (Tympanic)   Ht 5' 3" (1 6 m)   Wt 108 kg (238 lb 3 2 oz)   BMI 42 20 kg/m²       Physical Exam  Constitutional:       General: She is not in acute distress  Appearance: She is well-developed  Abdominal:      Palpations: Abdomen is soft  Tenderness: There is no abdominal tenderness  There is no guarding  Neurological:      Mental Status: She is alert and oriented to person, place, and time  Skin:     General: Skin is warm and dry  Psychiatric:         Behavior: Behavior normal              Counseling / Coordination of Care  Total time spent today20 minutes  minutes  Greater than 50% of total time was spent with the patient and / or family counseling and / or coordination of care

## 2020-08-14 ENCOUNTER — OFFICE VISIT (OUTPATIENT)
Dept: OBGYN CLINIC | Facility: CLINIC | Age: 30
End: 2020-08-14
Payer: COMMERCIAL

## 2020-08-14 VITALS
HEIGHT: 63 IN | WEIGHT: 238.2 LBS | HEART RATE: 60 BPM | SYSTOLIC BLOOD PRESSURE: 138 MMHG | TEMPERATURE: 98.8 F | DIASTOLIC BLOOD PRESSURE: 84 MMHG | BODY MASS INDEX: 42.21 KG/M2

## 2020-08-14 DIAGNOSIS — N94.6 DYSMENORRHEA: ICD-10-CM

## 2020-08-14 DIAGNOSIS — N93.9 ABNORMAL UTERINE BLEEDING (AUB): Primary | ICD-10-CM

## 2020-08-14 DIAGNOSIS — R93.89 ENDOMETRIAL THICKENING ON ULTRASOUND: ICD-10-CM

## 2020-08-14 DIAGNOSIS — N83.202 CYSTS OF BOTH OVARIES: ICD-10-CM

## 2020-08-14 DIAGNOSIS — N83.201 CYSTS OF BOTH OVARIES: ICD-10-CM

## 2020-08-14 PROCEDURE — 1036F TOBACCO NON-USER: CPT | Performed by: OBSTETRICS & GYNECOLOGY

## 2020-08-14 PROCEDURE — 99213 OFFICE O/P EST LOW 20 MIN: CPT | Performed by: OBSTETRICS & GYNECOLOGY

## 2020-08-14 PROCEDURE — 3008F BODY MASS INDEX DOCD: CPT | Performed by: OBSTETRICS & GYNECOLOGY

## 2020-08-14 RX ORDER — MEDROXYPROGESTERONE ACETATE 150 MG/ML
150 INJECTION, SUSPENSION INTRAMUSCULAR ONCE
Status: COMPLETED | OUTPATIENT
Start: 2020-08-14 | End: 2020-08-14

## 2020-08-14 RX ORDER — MEDROXYPROGESTERONE ACETATE 150 MG/ML
150 INJECTION, SUSPENSION INTRAMUSCULAR
Qty: 1 ML | Refills: 3 | Status: SHIPPED | OUTPATIENT
Start: 2020-08-14 | End: 2022-02-11

## 2020-08-14 RX ADMIN — MEDROXYPROGESTERONE ACETATE 150 MG: 150 INJECTION, SUSPENSION INTRAMUSCULAR at 14:04

## 2020-10-09 ENCOUNTER — OFFICE VISIT (OUTPATIENT)
Dept: FAMILY MEDICINE CLINIC | Facility: CLINIC | Age: 30
End: 2020-10-09
Payer: COMMERCIAL

## 2020-10-09 VITALS
HEIGHT: 62 IN | BODY MASS INDEX: 42.14 KG/M2 | TEMPERATURE: 98.3 F | SYSTOLIC BLOOD PRESSURE: 130 MMHG | DIASTOLIC BLOOD PRESSURE: 80 MMHG | WEIGHT: 229 LBS | HEART RATE: 54 BPM | OXYGEN SATURATION: 100 %

## 2020-10-09 DIAGNOSIS — K59.09 OTHER CONSTIPATION: Primary | ICD-10-CM

## 2020-10-09 PROCEDURE — 1036F TOBACCO NON-USER: CPT | Performed by: FAMILY MEDICINE

## 2020-10-09 PROCEDURE — 99214 OFFICE O/P EST MOD 30 MIN: CPT | Performed by: FAMILY MEDICINE

## 2020-10-09 PROCEDURE — 3725F SCREEN DEPRESSION PERFORMED: CPT | Performed by: FAMILY MEDICINE

## 2020-10-11 PROBLEM — K59.09 OTHER CONSTIPATION: Status: ACTIVE | Noted: 2020-10-11

## 2020-10-11 PROBLEM — K59.09 OTHER CONSTIPATION: Status: ACTIVE | Noted: 2020-10-09

## 2020-10-11 RX ORDER — PSYLLIUM SEED (WITH DEXTROSE)
POWDER (GRAM) ORAL DAILY
Qty: 538 G | Refills: 0
Start: 2020-10-11 | End: 2022-02-11

## 2021-06-09 ENCOUNTER — TELEPHONE (OUTPATIENT)
Dept: FAMILY MEDICINE CLINIC | Facility: CLINIC | Age: 31
End: 2021-06-09

## 2022-01-12 ENCOUNTER — OFFICE VISIT (OUTPATIENT)
Dept: FAMILY MEDICINE CLINIC | Facility: CLINIC | Age: 32
End: 2022-01-12
Payer: COMMERCIAL

## 2022-01-12 VITALS
SYSTOLIC BLOOD PRESSURE: 124 MMHG | BODY MASS INDEX: 44.65 KG/M2 | WEIGHT: 252 LBS | HEIGHT: 63 IN | HEART RATE: 68 BPM | TEMPERATURE: 97.8 F | DIASTOLIC BLOOD PRESSURE: 78 MMHG | RESPIRATION RATE: 16 BRPM | OXYGEN SATURATION: 98 %

## 2022-01-12 DIAGNOSIS — Z71.85 IMMUNIZATION COUNSELING: ICD-10-CM

## 2022-01-12 DIAGNOSIS — Z11.59 NEED FOR HEPATITIS C SCREENING TEST: ICD-10-CM

## 2022-01-12 DIAGNOSIS — E66.01 MORBID OBESITY WITH BMI OF 40.0-44.9, ADULT (HCC): ICD-10-CM

## 2022-01-12 DIAGNOSIS — Z00.01 ENCOUNTER FOR WELL ADULT EXAM WITH ABNORMAL FINDINGS: Primary | ICD-10-CM

## 2022-01-12 DIAGNOSIS — Z13.220 SCREENING, LIPID: ICD-10-CM

## 2022-01-12 DIAGNOSIS — Z13.29 SCREENING FOR ENDOCRINE DISORDER: ICD-10-CM

## 2022-01-12 DIAGNOSIS — Z11.4 SCREENING FOR HIV (HUMAN IMMUNODEFICIENCY VIRUS): ICD-10-CM

## 2022-01-12 PROCEDURE — 3008F BODY MASS INDEX DOCD: CPT | Performed by: FAMILY MEDICINE

## 2022-01-12 PROCEDURE — 1036F TOBACCO NON-USER: CPT | Performed by: FAMILY MEDICINE

## 2022-01-12 PROCEDURE — 99395 PREV VISIT EST AGE 18-39: CPT | Performed by: FAMILY MEDICINE

## 2022-01-12 PROCEDURE — 3725F SCREEN DEPRESSION PERFORMED: CPT | Performed by: FAMILY MEDICINE

## 2022-01-12 NOTE — PATIENT INSTRUCTIONS

## 2022-01-12 NOTE — PROGRESS NOTES
[address] [facility]    NAME: Neil Jaimes  AGE: 27 y o  SEX: female  : 1990     DATE: 2022     Assessment and Plan:     Problem List Items Addressed This Visit        Other    Morbid obesity with BMI of 40 0-44 9, adult ([address] )     Chronic uncontrolled patient unhappy with her weight and she trying to do diet but not successful we discussed bariatric option and she decline it will refer patient to medical weight management  Plan to check her thyroid function and the insulin level to rule out insulin resistance         Relevant Orders    Ambulatory Referral to Weight Management    CBC and differential    Insulin, fasting    Encounter for well adult exam with abnormal findings - Primary     Advice and education were given regarding nutrition, aerobic exercises, weight bearing exercises, cardiovascular risk reduction, fall risk reduction, and age appropriate supplements  The patient was counseled regarding instructions for management, risk factor reductions, prognosis, risks and benefits of treatment options, patient and family education, and importance of compliance with treatment  Immunization counseling     A discussed with the patient she is due for DTaP back and flu shot she declined for today           Other Visit Diagnoses     Screening, lipid        Relevant Orders    Lipid panel    Screening for endocrine disorder        Relevant Orders    TSH, 3rd generation with Free T4 reflex    Need for hepatitis C screening test        Relevant Orders    Hepatitis C antibody    Screening for HIV (human immunodeficiency virus)        Relevant Orders    Human Immunodeficiency Virus 1/2 Antigen / Antibody ( Fourth Generation) with Reflex Testing          Immunizations and preventive care screenings were discussed with patient today   Appropriate education was printed on patient's after visit summary  Counseling:  Alcohol/drug use: discussed moderation in alcohol intake, the recommendations for healthy alcohol use, and avoidance of illicit drug use  Dental Health: discussed importance of regular tooth brushing, flossing, and dental visits  Injury prevention: discussed safety/seat belts, safety helmets, smoke detectors, carbon dioxide detectors, and smoking near bedding or upholstery  Sexual health: discussed sexually transmitted diseases, partner selection, use of condoms, avoidance of unintended pregnancy, and contraceptive alternatives  · Exercise: the importance of regular exercise/physical activity was discussed  Recommend exercise 3-5 times per week for at least 30 minutes  BMI Counseling: Body mass index is 44 64 kg/m²  The BMI is above normal  Nutrition recommendations include decreasing portion sizes, decreasing fast food intake and limiting drinks that contain sugar  Exercise recommendations include exercising 3-5 times per week  No pharmacotherapy was ordered  Patient referred to weight management  Rationale for BMI follow-up plan is due to patient being overweight or obese  Depression Screening and Follow-up Plan: Patient was screened for depression during today's encounter  They screened negative with a PHQ-2 score of 0  No follow-ups on file  Chief Complaint:     Chief Complaint   Patient presents with    Physical Exam     patient is here today for her yearly physical exam       History of Present Illness:     Adult Annual Physical   Patient here for a comprehensive physical exam  The patient reports problems - weight gain  Diet and Physical Activity  · Diet/Nutrition: No special diet  · Exercise: no formal exercise        Depression Screening  PHQ-2/9 Depression Screening    Little interest or pleasure in doing things: 0 - not at all  Feeling down, depressed, or hopeless: 0 - not at all  PHQ-2 Score: 0  PHQ-2 Interpretation: Negative depression screen General Health  · Sleep: sleeps well  · Hearing: normal - bilateral   · Vision: no vision problems  · Dental: regular dental visits  /GYN Health  · Last menstrual period: 21  · Contraceptive method: none  · History of STDs?: no      Review of Systems:     Review of Systems   Constitutional: Negative for activity change, appetite change, fatigue and fever  HENT: Negative for congestion, ear pain, sinus pressure, sinus pain and sore throat  Eyes: Negative for pain, discharge, redness and itching  Respiratory: Negative for cough, chest tightness, shortness of breath and stridor  Cardiovascular: Negative for chest pain, palpitations and leg swelling  Gastrointestinal: Negative for abdominal pain, blood in stool, constipation, diarrhea and nausea  Genitourinary: Negative for dysuria, flank pain, frequency and hematuria  Musculoskeletal: Negative for back pain, joint swelling and neck pain  Skin: Negative for pallor and rash  Neurological: Negative for dizziness, tremors, weakness, numbness and headaches  Hematological: Does not bruise/bleed easily        Past Medical History:     Past Medical History:   Diagnosis Date    Abdominal pain     "sharp at times"    Heavy menses     Hypertension     not on meds    Irregular menses     Migraines     Moderate exercise     'stands and walks alot at work"    Obesity     Teeth missing       Past Surgical History:     Past Surgical History:   Procedure Laterality Date     SECTION      HERNIA REPAIR      "age 5 approx"    POLYPECTOMY N/A 2020    Procedure: POLYPECTOMY;  Surgeon: Karely Su MD;  [facility];  Service: Gynecology    [place] HYSTEROSCOPY,W/ENDO BX N/A 2020    Procedure: DILATATION AND CURETTAGE (D&C) WITH HYSTEROSCOPY;  Surgeon: Karely Su MD;  [facility];  Service: Gynecology      Social History:     Social History     Socioeconomic History    Marital status: /Civil Union     Spouse name: None    Number of children: None    Years of education: None    Highest education level: None   Occupational History    None   Tobacco Use    Smoking status: Never Smoker    Smokeless tobacco: Never Used   Vaping Use    Vaping Use: Some days    Substances: Nicotine, Flavoring   Substance and Sexual Activity    Alcohol use: Yes     Comment: social     Drug use: Never    Sexual activity: Not Currently   Other Topics Concern    None   Social History Narrative    None     Social Determinants of Health     Financial Resource Strain: Low Risk     Difficulty of Paying Living Expenses: Not hard at all   Food Insecurity: No Food Insecurity    Worried About Running Out of Food in the Last Year: Never true    Valerie of Food in the Last Year: Never true   Transportation Needs: No Transportation Needs    Lack of Transportation (Medical): No    Lack of Transportation (Non-Medical): No   Physical Activity: Inactive    Days of Exercise per Week: 0 days    Minutes of Exercise per Session: 0 min   Stress: No Stress Concern Present    Feeling of Stress : Not at all   Social Connections:  Moderately Isolated    Frequency of Communication with Friends and Family: More than three times a week    Frequency of Social Gatherings with Friends and Family: Twice a week    Attends [employer]ism Services: 1 to 4 times per year    Active Member of [employer] or Organizations: No    Attends Club or Organization Meetings: Never    Marital Status: Never    Intimate Partner Violence: Not At Risk    Fear of Current or Ex-Partner: No    Emotionally Abused: No    Physically Abused: No    Sexually Abused: No   Housing Stability: Unknown    Unable to Pay for Housing in the Last Year: No    Number of [place] in the Last Year: Not on file    Unstable Housing in the Last Year: No      Family History:     Family History   Problem Relation Age of Onset    Hypertension Mother     No Known Problems Father     No Known Problems Sister     No Known Problems Brother     No Known Problems Daughter     No Known Problems Maternal Grandmother     No Known Problems Maternal Grandfather     No Known Problems Paternal Grandmother     No Known Problems Sister       Current Medications:     Current Outpatient Medications   Medication Sig Dispense Refill    medroxyPROGESTERone (DEPO-PROVERA) 150 mg/mL injection Inject 1 mL (150 mg total) into a muscle every 3 (three) months (Patient not taking: Reported on 1/12/2022 ) 1 mL 3    norethindrone (AYGESTIN) 5 mg tablet Take 2 tablets (10 mg total) by mouth daily (Patient not taking: Reported on 1/12/2022 ) 60 tablet 3    Psyllium (Metamucil) 28 3 % POWD Take by mouth daily (Patient not taking: Reported on 1/12/2022 ) 538 g 0    TURMERIC PO Take by mouth daily (Patient not taking: Reported on 1/12/2022 )       No current facility-administered medications for this visit  Allergies: Allergies   Allergen Reactions    Avocado - Food Allergy Throat Swelling    Latex Itching and Swelling     "with condoms"    Eggs Or Egg-Derived Products - Food Allergy Itching     Eggs if bought at a fast food place/regular hard boiled can eat at home    Watermelon [Citrullus Vulgaris] Itching     Of throat      Physical Exam:     /78 (BP Location: Left arm, Patient Position: Sitting, Cuff Size: Large)   Pulse 68   Temp 97 8 °F (36 6 °C) (Tympanic)   Resp 16   Ht 5' 3" (1 6 m)   Wt 114 kg (252 lb)   LMP 12/12/2021 (Exact Date)   SpO2 98%   BMI 44 64 kg/m²     Physical Exam  Vitals and nursing note reviewed  Constitutional:       General: She is not in acute distress  Appearance: She is well-developed  HENT:      Head: Normocephalic and atraumatic  Eyes:      Conjunctiva/sclera: Conjunctivae normal    Cardiovascular:      Rate and Rhythm: Normal rate and regular rhythm  Heart sounds: No murmur heard        Pulmonary:      Effort: Pulmonary effort is normal  No respiratory distress  Breath sounds: Normal breath sounds  Abdominal:      Palpations: Abdomen is soft  Tenderness: There is no abdominal tenderness  Musculoskeletal:      Cervical back: Neck supple  Skin:     General: Skin is warm and dry  Neurological:      Mental Status: She is alert            Tiffanie Quinn MD   [address] [place] [address]

## 2022-01-12 NOTE — ASSESSMENT & PLAN NOTE
Chronic uncontrolled patient unhappy with her weight and she trying to do diet but not successful we discussed bariatric option and she decline it will refer patient to medical weight management  Plan to check her thyroid function and the insulin level to rule out insulin resistance

## 2022-01-26 ENCOUNTER — APPOINTMENT (OUTPATIENT)
Dept: LAB | Facility: HOSPITAL | Age: 32
End: 2022-01-26
Payer: COMMERCIAL

## 2022-01-26 DIAGNOSIS — Z11.4 SCREENING FOR HIV (HUMAN IMMUNODEFICIENCY VIRUS): ICD-10-CM

## 2022-01-26 DIAGNOSIS — Z13.220 SCREENING, LIPID: ICD-10-CM

## 2022-01-26 DIAGNOSIS — Z11.59 NEED FOR HEPATITIS C SCREENING TEST: ICD-10-CM

## 2022-01-26 DIAGNOSIS — E66.01 MORBID OBESITY WITH BMI OF 40.0-44.9, ADULT (HCC): ICD-10-CM

## 2022-01-26 DIAGNOSIS — Z13.29 SCREENING FOR ENDOCRINE DISORDER: ICD-10-CM

## 2022-01-26 LAB
BASOPHILS # BLD AUTO: 0.02 THOUSANDS/ΜL (ref 0–0.1)
BASOPHILS NFR BLD AUTO: 0 % (ref 0–1)
CHOLEST SERPL-MCNC: 211 MG/DL
EOSINOPHIL # BLD AUTO: 0.06 THOUSAND/ΜL (ref 0–0.61)
EOSINOPHIL NFR BLD AUTO: 1 % (ref 0–6)
ERYTHROCYTE [DISTWIDTH] IN BLOOD BY AUTOMATED COUNT: 14.1 % (ref 11.6–15.1)
HCT VFR BLD AUTO: 40.4 % (ref 34.8–46.1)
HCV AB SER QL: NORMAL
HDLC SERPL-MCNC: 64 MG/DL
HGB BLD-MCNC: 13.1 G/DL (ref 11.5–15.4)
HIV 1+2 AB+HIV1 P24 AG SERPL QL IA: NORMAL
IMM GRANULOCYTES # BLD AUTO: 0.02 THOUSAND/UL (ref 0–0.2)
IMM GRANULOCYTES NFR BLD AUTO: 0 % (ref 0–2)
INSULIN SERPL-ACNC: 13.6 MU/L (ref 3–25)
LDLC SERPL CALC-MCNC: 134 MG/DL (ref 0–100)
LYMPHOCYTES # BLD AUTO: 2.03 THOUSANDS/ΜL (ref 0.6–4.47)
LYMPHOCYTES NFR BLD AUTO: 36 % (ref 14–44)
MCH RBC QN AUTO: 28.7 PG (ref 26.8–34.3)
MCHC RBC AUTO-ENTMCNC: 32.4 G/DL (ref 31.4–37.4)
MCV RBC AUTO: 88 FL (ref 82–98)
MONOCYTES # BLD AUTO: 0.34 THOUSAND/ΜL (ref 0.17–1.22)
MONOCYTES NFR BLD AUTO: 6 % (ref 4–12)
NEUTROPHILS # BLD AUTO: 3.12 THOUSANDS/ΜL (ref 1.85–7.62)
NEUTS SEG NFR BLD AUTO: 57 % (ref 43–75)
NONHDLC SERPL-MCNC: 147 MG/DL
NRBC BLD AUTO-RTO: 0 /100 WBCS
PLATELET # BLD AUTO: 308 THOUSANDS/UL (ref 149–390)
PMV BLD AUTO: 10.2 FL (ref 8.9–12.7)
RBC # BLD AUTO: 4.57 MILLION/UL (ref 3.81–5.12)
TRIGL SERPL-MCNC: 63 MG/DL
TSH SERPL DL<=0.05 MIU/L-ACNC: 0.87 UIU/ML (ref 0.36–3.74)
WBC # BLD AUTO: 5.59 THOUSAND/UL (ref 4.31–10.16)

## 2022-01-26 PROCEDURE — 84443 ASSAY THYROID STIM HORMONE: CPT

## 2022-01-26 PROCEDURE — 85025 COMPLETE CBC W/AUTO DIFF WBC: CPT

## 2022-01-26 PROCEDURE — 80061 LIPID PANEL: CPT

## 2022-01-26 PROCEDURE — 36415 COLL VENOUS BLD VENIPUNCTURE: CPT

## 2022-01-26 PROCEDURE — 86803 HEPATITIS C AB TEST: CPT

## 2022-01-26 PROCEDURE — 83525 ASSAY OF INSULIN: CPT

## 2022-01-26 PROCEDURE — 87389 HIV-1 AG W/HIV-1&-2 AB AG IA: CPT

## 2022-02-11 ENCOUNTER — OFFICE VISIT (OUTPATIENT)
Dept: FAMILY MEDICINE CLINIC | Facility: CLINIC | Age: 32
End: 2022-02-11
Payer: COMMERCIAL

## 2022-02-11 VITALS
HEART RATE: 59 BPM | DIASTOLIC BLOOD PRESSURE: 82 MMHG | WEIGHT: 254 LBS | OXYGEN SATURATION: 97 % | TEMPERATURE: 98.3 F | BODY MASS INDEX: 45 KG/M2 | SYSTOLIC BLOOD PRESSURE: 128 MMHG | HEIGHT: 63 IN

## 2022-02-11 DIAGNOSIS — E66.01 MORBID OBESITY WITH BMI OF 40.0-44.9, ADULT (HCC): ICD-10-CM

## 2022-02-11 DIAGNOSIS — E78.2 MIXED HYPERLIPIDEMIA: Primary | ICD-10-CM

## 2022-02-11 PROCEDURE — 99214 OFFICE O/P EST MOD 30 MIN: CPT | Performed by: FAMILY MEDICINE

## 2022-02-11 PROCEDURE — 1036F TOBACCO NON-USER: CPT | Performed by: FAMILY MEDICINE

## 2022-02-11 PROCEDURE — 3008F BODY MASS INDEX DOCD: CPT | Performed by: FAMILY MEDICINE

## 2022-02-12 PROBLEM — E78.2 MIXED HYPERLIPIDEMIA: Status: ACTIVE | Noted: 2022-02-12

## 2022-02-12 PROBLEM — E78.2 MIXED HYPERLIPIDEMIA: Status: ACTIVE | Noted: 2022-02-11

## 2022-02-12 NOTE — ASSESSMENT & PLAN NOTE
Chronic uncontrolled patient the tried different kind of diet an not successful recommend to be evaluated by weight management

## 2022-02-12 NOTE — PROGRESS NOTES
Subjective:   Chief Complaint   Patient presents with    Follow-up     1m; labs        Patient ID: Iris Campbell is a 27 y o  female  Patient here follow-up with a chronic condition and she is concerned about the weight she has been hard time to lose weight a she been trying different kind of diet and try to watch for the portion not successful recent blood work discussed with the patient      The following portions of the patient's history were reviewed and updated as appropriate: allergies, current medications, past family history, past medical history, past social history, past surgical history and problem list     Review of Systems   Constitutional: Negative for activity change, appetite change, fatigue and fever  HENT: Negative for congestion, ear pain, sinus pressure, sinus pain and sore throat  Eyes: Negative for pain, discharge, redness and itching  Respiratory: Negative for cough, chest tightness, shortness of breath and stridor  Cardiovascular: Negative for chest pain, palpitations and leg swelling  Gastrointestinal: Negative for abdominal pain, blood in stool, constipation, diarrhea and nausea  Genitourinary: Negative for dysuria, flank pain, frequency and hematuria  Musculoskeletal: Negative for back pain, joint swelling and neck pain  Skin: Negative for pallor and rash  Neurological: Negative for dizziness, tremors, weakness, numbness and headaches  Hematological: Does not bruise/bleed easily  Objective:  Vitals:    02/11/22 0944 02/11/22 1015   BP: 128/82    BP Location: Left arm    Patient Position: Sitting    Cuff Size: Extra-Large    Pulse: (!) 48 59   Temp: 98 3 °F (36 8 °C)    SpO2: 97%    Weight: 115 kg (254 lb)    Height: 5' 3" (1 6 m)       Physical Exam  Vitals and nursing note reviewed  Constitutional:       General: She is not in acute distress  Appearance: She is well-developed  She is not diaphoretic  HENT:      Head: Normocephalic  Right Ear: Tympanic membrane, ear canal and external ear normal       Left Ear: Tympanic membrane, ear canal and external ear normal       Nose: Nose normal  No congestion or rhinorrhea  Mouth/Throat:      Mouth: Mucous membranes are moist       Pharynx: Oropharynx is clear  No oropharyngeal exudate or posterior oropharyngeal erythema  Eyes:      General:         Right eye: No discharge  Left eye: No discharge  Conjunctiva/sclera: Conjunctivae normal       Pupils: Pupils are equal, round, and reactive to light  Neck:      Vascular: No JVD  Cardiovascular:      Rate and Rhythm: Normal rate and regular rhythm  Heart sounds: Normal heart sounds  No murmur heard  No gallop  Pulmonary:      Effort: Pulmonary effort is normal  No respiratory distress  Breath sounds: Normal breath sounds  No stridor  No wheezing or rales  Chest:      Chest wall: No tenderness  Abdominal:      General: There is no distension  Palpations: Abdomen is soft  There is no mass  Tenderness: There is no abdominal tenderness  There is no rebound  Musculoskeletal:         General: No tenderness  Cervical back: Normal range of motion and neck supple  Lymphadenopathy:      Cervical: No cervical adenopathy  Skin:     General: Skin is warm  Findings: No erythema or rash  Neurological:      Mental Status: She is alert and oriented to person, place, and time  Motor: No weakness  Gait: Gait normal            Assessment/Plan:    Mixed hyperlipidemia  A new diagnosis asymptomatic discussed the patient important lose weight and low fat diet    Morbid obesity with BMI of 40 0-44 9, adult (HCC)  Chronic uncontrolled patient the tried different kind of diet an not successful recommend to be evaluated by weight management        Diagnoses and all orders for this visit:    Mixed hyperlipidemia  -     Lipid Panel with Direct LDL reflex;  Future  -     Basic metabolic panel; Future    Morbid obesity with BMI of 40 0-44 9, adult ([facility] [address] )  -     Ambulatory Referral to Weight Management; Future  -     Lipid Panel with Direct LDL reflex; Future  -     Basic metabolic panel;  Future

## 2022-03-07 ENCOUNTER — APPOINTMENT (OUTPATIENT)
Dept: LAB | Facility: HOSPITAL | Age: 32
End: 2022-03-07
Payer: COMMERCIAL

## 2022-03-07 DIAGNOSIS — E78.2 MIXED HYPERLIPIDEMIA: ICD-10-CM

## 2022-03-07 DIAGNOSIS — E66.01 MORBID OBESITY WITH BMI OF 40.0-44.9, ADULT (HCC): ICD-10-CM

## 2022-03-07 LAB
ANION GAP SERPL CALCULATED.3IONS-SCNC: 8 MMOL/L (ref 4–13)
BUN SERPL-MCNC: 14 MG/DL (ref 5–25)
CALCIUM SERPL-MCNC: 8.4 MG/DL (ref 8.3–10.1)
CHLORIDE SERPL-SCNC: 103 MMOL/L (ref 100–108)
CHOLEST SERPL-MCNC: 201 MG/DL
CO2 SERPL-SCNC: 27 MMOL/L (ref 21–32)
CREAT SERPL-MCNC: 0.89 MG/DL (ref 0.6–1.3)
GFR SERPL CREATININE-BSD FRML MDRD: 85 ML/MIN/1.73SQ M
GLUCOSE P FAST SERPL-MCNC: 91 MG/DL (ref 65–99)
HDLC SERPL-MCNC: 63 MG/DL
LDLC SERPL CALC-MCNC: 127 MG/DL (ref 0–100)
POTASSIUM SERPL-SCNC: 4.2 MMOL/L (ref 3.5–5.3)
SODIUM SERPL-SCNC: 138 MMOL/L (ref 136–145)
TRIGL SERPL-MCNC: 57 MG/DL

## 2022-03-07 PROCEDURE — 36415 COLL VENOUS BLD VENIPUNCTURE: CPT

## 2022-03-07 PROCEDURE — 80061 LIPID PANEL: CPT

## 2022-03-07 PROCEDURE — 80048 BASIC METABOLIC PNL TOTAL CA: CPT

## 2022-03-09 ENCOUNTER — TELEPHONE (OUTPATIENT)
Dept: BARIATRICS | Facility: CLINIC | Age: 32
End: 2022-03-09

## 2022-04-22 ENCOUNTER — CONSULT (OUTPATIENT)
Dept: BARIATRICS | Facility: CLINIC | Age: 32
End: 2022-04-22
Payer: COMMERCIAL

## 2022-04-22 VITALS
HEIGHT: 64 IN | BODY MASS INDEX: 43.62 KG/M2 | SYSTOLIC BLOOD PRESSURE: 136 MMHG | DIASTOLIC BLOOD PRESSURE: 86 MMHG | WEIGHT: 255.5 LBS | HEART RATE: 66 BPM | TEMPERATURE: 98.8 F

## 2022-04-22 DIAGNOSIS — E66.01 MORBID OBESITY WITH BMI OF 40.0-44.9, ADULT (HCC): Primary | ICD-10-CM

## 2022-04-22 DIAGNOSIS — E78.2 MIXED HYPERLIPIDEMIA: ICD-10-CM

## 2022-04-22 PROCEDURE — 99203 OFFICE O/P NEW LOW 30 MIN: CPT | Performed by: FAMILY MEDICINE

## 2022-04-22 NOTE — PATIENT INSTRUCTIONS
Goals:  Do not skip any meals! Food log (ie ) www myfitnesspal com,sparkpeople  com,loseit com,calorieking  com,etc  baritastic (use skinnytaste  com, dietdoctor  com or smartphone angela Trippifi for recipes)  No sugary beverages  At least 64oz of water daily  Increase physical activity by 10 minutes daily   Gradually increase physical activity to a goal of 5 days per week for 30 minutes of MODERATE intensity PLUS 2 days per week of FULL BODY resistance training (use smartphone apps [employer], Home Workout, etc )  Goal protein 100g per day   4940-3942 calories per day    Try these alternative food options:  Protein shakes- Premier, Pure protein, Fairlife (30g or 42g protein), Iconic  Lactose free protein shakes-  Ripple, Iconic, [place]  Protein bars- Quest, Pure protein  Ice cream- Yasmany's, Yasso, Enlightened, Halo Top   Chips- Quest protein chips, Cheese crisps   Bread- 647 wheat, Protein Keto bread, whole wheat ariel bread, low carb/high protein wraps  Pasta- Chickpea pasta, Pasta zero or similar  Rice- Cauliflower rice, quinoa, brown rice  Fruits- berries, cantaloupe, peaches, apples, oranges  Yogurt- Ratio (25g protein), Two good, Chobani 60 marilynn or 100 marilynn, Oikos triple zero  Sugar alternatives- Stevia, Monk fruit, Swerve

## 2022-04-22 NOTE — PROGRESS NOTES
Assessment/Plan:  Tylor Gomez was seen today for consult  Diagnoses and all orders for this visit:    Morbid obesity with BMI of 40 0-44 9, adult [facility])  -     Ambulatory Referral to Weight Management  Patient will be pursuing conservative option  Her current diet is very carb heavy consisting of mostly rice  I have asked that she increase her protein intake by eating more chicken with lunch and dinner and cutting back on her rice intake  I recommended she have something for breakfast such as a protein shake  A list of protein shakes will be provided  Patient should start to see some weight loss if she follows these two recommendations  In addition she does need to drink more water, at least 64 oz daily  Getting some exercise at least two days per week is advised  I will be more than happy to start her on medication during her follow-up appointment but dietary adjustment is initially advised  I gave her a list of medications to call her insurance and inquire about coverage  Mixed hyperlipidemia  Should improve with weight loss  - Discussed options of HealthyCORE-Intensive Lifestyle Intervention Program, Very Low Calorie Diet-VLCD, Conservative Program, Bruce-En-Y Gastric Bypass and Vertical Sleeve Gastrectomy and the role of weight loss medications  - Explained the importance of making lifestyle changes first before starting any anti-obesity medications  Patient should demonstrate lifestyle changes first before anti-obesity medication can be initiated  - Patient is interested in pursuing Conservative Program  - Initial weight loss goal of 5-10% weight loss for improved health  - Weight loss can improve patient's co-morbid conditions and/or prevent weight-related complications  Goals:  Do not skip any meals! Food log (ie ) www myfitnesspal com,sparkpeople  com,loseit com,calorieking  com,etc  baritastic (use skinnytaste  com, dietdoctor  com or smartphone angela Profitero for recipes)  No sugary beverages  At least 64oz of water daily  Increase physical activity by 10 minutes daily  Gradually increase physical activity to a goal of 5 days per week for 30 minutes of MODERATE intensity PLUS 2 days per week of FULL BODY resistance training (use smartphone apps FitON, Home Workout, etc )  Goal protein 100g per day   4809-4465 calories per day    Total time spent: 30 min, with >50% face-to-face time spent counseling patient on nonsurgical interventions for the treatment of excess weight  Discussed in detail nonsurgical options including intensive lifestyle intervention program, very low-calorie diet program and conservative program   Discussed the role of weight loss medications  Counseled patient on diet behavior and exercise modification for weight loss  Follow up in approximately 6-8 weeks with Non-Surgical Physician/Advanced Practitioner  Subjective:   Chief Complaint   Patient presents with    Consult       Patient ID: Anum Hanson  is a 34 y o  female with excess weight/obesity here to pursue weight management  Previous notes and records have been reviewed      Past Medical History:   Diagnosis Date    Abdominal pain     "sharp at times"    Heavy menses     Hypertension     not on meds    Irregular menses     Migraines     Moderate exercise     'stands and walks alot at work"    Obesity     Teeth missing      Past Surgical History:   Procedure Laterality Date    [address]      "age 5 approx"    POLYPECTOMY N/A 6/16/2020    Procedure: POLYPECTOMY;  Surgeon: Jesi James MD;  [facility];  Service: Gynecology    [place] HYSTEROSCOPY,W/ENDO BX N/A 6/16/2020    Procedure: DILATATION AND CURETTAGE (D&C) WITH HYSTEROSCOPY;  Surgeon: Jesi James MD;  [facility];  Service: Gynecology       HPI:  Wt Readings from Last 20 Encounters:   04/22/22 116 kg (255 lb 8 oz)   02/11/22 115 kg (254 lb)   01/12/22 114 kg (252 lb)   10/09/20 104 kg (229 lb)   20 108 kg (238 lb 3 2 oz)   20 113 kg (250 lb)   20 114 kg (252 lb 6 4 oz)   20 114 kg (251 lb)   06/10/20 114 kg (251 lb 12 8 oz)   20 112 kg (248 lb)   20 116 kg (256 lb)     Obesity/Excess Weight:  Severity: Severe  Onset:  childhood    Modifiers: Diet and Exercise, Commercial Weight Loss Programs-ie  Weight Watchers, Dellia Bladimir, Nutrisystem, etc  and Over the Counter Weight Loss Supplements  Contributing factors: Poor Food Choices and Insufficient Physical Activity  Associated symptoms: decreased exercise capacity    Food logging and states 6524-6412 calories daily   Patient is not interested in meeting with a dietitian  She likes to eat her current diet which consists of mostly rice with chicken  She is from the [place]  Does not want surgery  Interested in medication  B- skip  S- no  L- burger from [employer] or rice with chicken (mostly rice with little chicken)  S- no  D- rice with chicken (mostly rice with little chicken)  S- no    Hydration: 4 bottles water daily, 1 bottle soda daily  Alcohol: 2 etoh per week  Smoking: occ [place]  Exercise: no formal  Occupation: active at work, Manager @ [employer]  Sleep: 7hrs  STOP ban/8    The following portions of the patient's history were reviewed and updated as appropriate: allergies, current medications, past family history, past medical history, past social history, past surgical history, and problem list     Review of Systems   Constitutional: Negative for fever  Respiratory: Negative for shortness of breath  Cardiovascular: Negative for chest pain  Gastrointestinal: Negative for abdominal pain and blood in stool  Genitourinary: Negative for dysuria and hematuria  Musculoskeletal: Negative for arthralgias and myalgias  Skin: Negative for rash  Neurological: Negative for headaches  Psychiatric/Behavioral: Negative for dysphoric mood and suicidal ideas  The patient is not nervous/anxious  Objective:  /86 (BP Location: Right arm, Patient Position: Sitting, Cuff Size: Standard)   Pulse 66   Temp 98 8 °F (37 1 °C) (Tympanic)   Ht 5' 3 6" (1 615 m)   Wt 116 kg (255 lb 8 oz)   BMI 44 41 kg/m²   Constitutional: Well-developed, well-nourished and obese Body mass index is 44 41 kg/m²  Nj Verma HEENT: No conjunctival injection  Pulmonary: No increased work of breathing or signs of respiratory distress  CV: Well-perfused  GI: obese  Non-distended  MSK: No edema   Neuro: Oriented to person, place and time  Normal Speech  Normal gait  Psych: Normal affect and mood  Labs and Imaging  Recent labs and imaging have been personally reviewed    Lab Results   Component Value Date    WBC 5 59 01/26/2022    HGB 13 1 01/26/2022    HCT 40 4 01/26/2022    MCV 88 01/26/2022     01/26/2022     Lab Results   Component Value Date    SODIUM 138 03/07/2022    K 4 2 03/07/2022     03/07/2022    CO2 27 03/07/2022    AGAP 8 03/07/2022    BUN 14 03/07/2022    CREATININE 0 89 03/07/2022    GLUF 91 03/07/2022    CALCIUM 8 4 03/07/2022    AST 20 06/03/2020    ALT 17 06/03/2020    ALKPHOS 75 06/03/2020    TP 6 8 06/03/2020    TBILI 0 40 06/03/2020    EGFR 85 03/07/2022     No results found for: HGBA1C  Lab Results   Component Value Date    SLB6RTQNVRQB 0 873 01/26/2022     Lab Results   Component Value Date    CHOLESTEROL 201 (H) 03/07/2022     Lab Results   Component Value Date    HDL 63 03/07/2022     Lab Results   Component Value Date    TRIG 57 03/07/2022     Lab Results   Component Value Date    LDLCALC 127 (H) 03/07/2022

## 2022-04-29 ENCOUNTER — OFFICE VISIT (OUTPATIENT)
Dept: FAMILY MEDICINE CLINIC | Facility: CLINIC | Age: 32
End: 2022-04-29
Payer: COMMERCIAL

## 2022-04-29 VITALS
SYSTOLIC BLOOD PRESSURE: 160 MMHG | TEMPERATURE: 100.3 F | DIASTOLIC BLOOD PRESSURE: 100 MMHG | HEIGHT: 64 IN | OXYGEN SATURATION: 99 % | HEART RATE: 72 BPM | RESPIRATION RATE: 18 BRPM | BODY MASS INDEX: 44.76 KG/M2 | WEIGHT: 262.2 LBS

## 2022-04-29 DIAGNOSIS — E78.2 MIXED HYPERLIPIDEMIA: ICD-10-CM

## 2022-04-29 DIAGNOSIS — E66.01 MORBID OBESITY WITH BMI OF 45.0-49.9, ADULT (HCC): ICD-10-CM

## 2022-04-29 DIAGNOSIS — I10 UNCONTROLLED HYPERTENSION: Primary | ICD-10-CM

## 2022-04-29 PROCEDURE — 3008F BODY MASS INDEX DOCD: CPT | Performed by: FAMILY MEDICINE

## 2022-04-29 PROCEDURE — 99214 OFFICE O/P EST MOD 30 MIN: CPT | Performed by: FAMILY MEDICINE

## 2022-04-29 RX ORDER — LOSARTAN POTASSIUM 50 MG/1
50 TABLET ORAL DAILY
Qty: 30 TABLET | Refills: 0 | Status: SHIPPED | OUTPATIENT
Start: 2022-04-29 | End: 2022-05-31 | Stop reason: ALTCHOICE

## 2022-04-29 NOTE — ASSESSMENT & PLAN NOTE
A chronic uncontrolled patient just recently started the evaluated by weight management who the I recommend the to follow their recommendation and diet plan

## 2022-04-29 NOTE — PROGRESS NOTES
Subjective:   Chief Complaint   Patient presents with    Hypertension     pt BP has been high     Weight Check     pt concern about rapid weight gain; 10 pounds in  1week         Patient ID: Amanda Casillas is a 34 y o  female  The patient recently was at the dentist and the could not have a dental work done her blood pressure was 150 /110 patient was told before she had history of hypertension and she was placed on medication could not tolerated stop it and she has not been taking medication she been gaining weight deny any headache no blood in the urine no cough no wheezing no chest pain short of breath no lower extremity edema also patient concerned about her weight she did gain weight all almost the 8 lb compared with before recently started see weight management      The following portions of the patient's history were reviewed and updated as appropriate: allergies, current medications, past family history, past medical history, past social history, past surgical history and problem list     Review of Systems   Constitutional: Positive for unexpected weight change  Negative for activity change, appetite change, fatigue and fever  HENT: Negative for congestion, ear pain, sinus pressure, sinus pain and sore throat  Eyes: Negative for pain, discharge, redness and itching  Respiratory: Negative for cough, chest tightness, shortness of breath and stridor  Cardiovascular: Negative for chest pain, palpitations and leg swelling  Gastrointestinal: Negative for abdominal pain, blood in stool, constipation, diarrhea and nausea  Genitourinary: Negative for dysuria, flank pain, frequency and hematuria  Musculoskeletal: Negative for back pain, joint swelling and neck pain  Skin: Negative for pallor and rash  Neurological: Negative for dizziness, tremors, weakness, numbness and headaches  Hematological: Does not bruise/bleed easily               Objective:  Vitals:    04/29/22 1054   BP: 160/100   BP Location: Left arm   Patient Position: Sitting   Cuff Size: Large   Pulse: 72   Resp: 18   Temp: 100 3 °F (37 9 °C)   TempSrc: Tympanic   SpO2: 99%   Weight: 119 kg (262 lb 3 2 oz)   Height: 5' 3 6" (1 615 m)      Physical Exam  Vitals and nursing note reviewed  Constitutional:       General: She is not in acute distress  Appearance: She is well-developed  HENT:      Head: Normocephalic and atraumatic  Nose: No congestion or rhinorrhea  Mouth/Throat:      Pharynx: No oropharyngeal exudate or posterior oropharyngeal erythema  Eyes:      General:         Right eye: No discharge  Left eye: No discharge  Neck:      Vascular: No JVD  Cardiovascular:      Rate and Rhythm: Normal rate and regular rhythm  Heart sounds: Normal heart sounds  No murmur heard  No friction rub  No gallop  Pulmonary:      Effort: Pulmonary effort is normal       Breath sounds: Normal breath sounds  Abdominal:      General: Bowel sounds are normal       Palpations: Abdomen is soft  Tenderness: There is no abdominal tenderness  Neurological:      Mental Status: She is oriented to person, place, and time  Assessment/Plan:    Uncontrolled hypertension  Uncontrolled blood pressure we start her on losartan 50 mg once a day proper use and possible side effect discussed the patient low-salt diet important lose weight review with the patient will recheck in 3-4 week    Morbid obesity with BMI of 45 0-49 9, adult ([facility])  A chronic uncontrolled patient just recently started the evaluated by weight management who the I recommend the to follow their recommendation and diet plan       Diagnoses and all orders for this visit:    Uncontrolled hypertension  -     losartan (COZAAR) 50 mg tablet;  Take 1 tablet (50 mg total) by mouth daily    Mixed hyperlipidemia    Morbid obesity with BMI of 45 0-49 9, adult ([facility] [address] )

## 2022-04-29 NOTE — ASSESSMENT & PLAN NOTE
Uncontrolled blood pressure we start her on losartan 50 mg once a day proper use and possible side effect discussed the patient low-salt diet important lose weight review with the patient will recheck in 3-4 week

## 2022-05-31 ENCOUNTER — OFFICE VISIT (OUTPATIENT)
Dept: FAMILY MEDICINE CLINIC | Facility: CLINIC | Age: 32
End: 2022-05-31
Payer: COMMERCIAL

## 2022-05-31 VITALS
DIASTOLIC BLOOD PRESSURE: 100 MMHG | SYSTOLIC BLOOD PRESSURE: 150 MMHG | TEMPERATURE: 99 F | BODY MASS INDEX: 47.84 KG/M2 | WEIGHT: 260 LBS | OXYGEN SATURATION: 98 % | HEIGHT: 62 IN | HEART RATE: 75 BPM

## 2022-05-31 DIAGNOSIS — I10 UNCONTROLLED HYPERTENSION: ICD-10-CM

## 2022-05-31 PROCEDURE — 3008F BODY MASS INDEX DOCD: CPT | Performed by: FAMILY MEDICINE

## 2022-05-31 PROCEDURE — 99213 OFFICE O/P EST LOW 20 MIN: CPT | Performed by: FAMILY MEDICINE

## 2022-05-31 RX ORDER — LOSARTAN POTASSIUM 50 MG/1
50 TABLET ORAL DAILY
Qty: 30 TABLET | Refills: 2 | Status: CANCELLED | OUTPATIENT
Start: 2022-05-31

## 2022-05-31 RX ORDER — LOSARTAN POTASSIUM AND HYDROCHLOROTHIAZIDE 25; 100 MG/1; MG/1
1 TABLET ORAL DAILY
Qty: 90 TABLET | Refills: 3 | Status: SHIPPED | OUTPATIENT
Start: 2022-05-31

## 2022-06-01 NOTE — ASSESSMENT & PLAN NOTE
Chronic improved blood pressure from 160/100 to 150/100 still uncontrolled a plan    Losartan 50 mg start losartan /25 mg once a day proper use and possible side effect discussed the patient low-salt diet important lose weight review with the patient

## 2022-06-01 NOTE — PROGRESS NOTES
Subjective:   Chief Complaint   Patient presents with    Follow-up     Chronic conditions        Patient ID: Radha Hardy is a 34 y o  female  Patient here follow-up with the hypertension patient diagnosed with hypertension and started on losartan 50 mg once a day pain tolerated well without side effect and and compliant with it taking daily a blood pressure today improved compared with before but still high 150 above 100 deny any chest pain short of breath no palpitation no lower extremity edema      The following portions of the patient's history were reviewed and updated as appropriate: allergies, current medications, past family history, past medical history, past social history, past surgical history and problem list     Review of Systems   Constitutional: Negative for activity change, appetite change, fatigue and fever  HENT: Negative for congestion, ear pain, sinus pressure, sinus pain and sore throat  Eyes: Negative for pain, discharge, redness and itching  Respiratory: Negative for cough, chest tightness, shortness of breath and stridor  Cardiovascular: Negative for chest pain, palpitations and leg swelling  Gastrointestinal: Negative for abdominal pain, blood in stool, constipation, diarrhea and nausea  Genitourinary: Negative for dysuria, flank pain, frequency and hematuria  Musculoskeletal: Negative for back pain, joint swelling and neck pain  Skin: Negative for pallor and rash  Neurological: Negative for dizziness, tremors, weakness, numbness and headaches  Hematological: Does not bruise/bleed easily  Objective:  Vitals:    05/31/22 1021   BP: 150/100   Pulse: 75   Temp: 99 °F (37 2 °C)   TempSrc: Tympanic   SpO2: 98%   Weight: 118 kg (260 lb)   Height: 5' 2" (1 575 m)      Physical Exam  Vitals and nursing note reviewed  Constitutional:       General: She is not in acute distress  Appearance: She is well-developed     HENT:      Head: Normocephalic and atraumatic  Nose: No congestion  Mouth/Throat:      Pharynx: No oropharyngeal exudate or posterior oropharyngeal erythema  Neck:      Vascular: No JVD  Cardiovascular:      Rate and Rhythm: Normal rate and regular rhythm  Heart sounds: Normal heart sounds  No murmur heard  No friction rub  No gallop  Pulmonary:      Effort: Pulmonary effort is normal       Breath sounds: Normal breath sounds  Abdominal:      General: Bowel sounds are normal       Palpations: Abdomen is soft  Tenderness: There is no abdominal tenderness  Neurological:      Mental Status: She is oriented to person, place, and time  Assessment/Plan:    Uncontrolled hypertension  Chronic improved blood pressure from 160/100 to 150/100 still uncontrolled a plan  Losartan 50 mg start losartan /25 mg once a day proper use and possible side effect discussed the patient low-salt diet important lose weight review with the patient       Diagnoses and all orders for this visit:    Uncontrolled hypertension  -     losartan-hydrochlorothiazide (HYZAAR) 100-25 MG per tablet;  Take 1 tablet by mouth daily

## 2022-06-17 ENCOUNTER — OFFICE VISIT (OUTPATIENT)
Dept: BARIATRICS | Facility: CLINIC | Age: 32
End: 2022-06-17
Payer: COMMERCIAL

## 2022-06-17 VITALS
WEIGHT: 261.1 LBS | SYSTOLIC BLOOD PRESSURE: 148 MMHG | HEIGHT: 62 IN | TEMPERATURE: 97.8 F | HEART RATE: 69 BPM | BODY MASS INDEX: 48.05 KG/M2 | DIASTOLIC BLOOD PRESSURE: 86 MMHG

## 2022-06-17 DIAGNOSIS — E66.01 MORBID OBESITY WITH BMI OF 40.0-44.9, ADULT (HCC): Primary | ICD-10-CM

## 2022-06-17 DIAGNOSIS — I10 UNCONTROLLED HYPERTENSION: ICD-10-CM

## 2022-06-17 DIAGNOSIS — E78.2 MIXED HYPERLIPIDEMIA: ICD-10-CM

## 2022-06-17 PROCEDURE — 99214 OFFICE O/P EST MOD 30 MIN: CPT | Performed by: FAMILY MEDICINE

## 2022-06-17 RX ORDER — PEN NEEDLE, DIABETIC 32 GX 1/4"
NEEDLE, DISPOSABLE MISCELLANEOUS
Qty: 100 EACH | Refills: 3 | Status: SHIPPED | OUTPATIENT
Start: 2022-06-17

## 2022-06-17 NOTE — PROGRESS NOTES
Assessment/Plan:  Gloria Alicia was seen today for follow-up  Diagnoses and all orders for this visit:    Morbid obesity with BMI of 40 0-44 9, adult ([address] )  -     liraglutide (SAXENDA) injection; Inject 0 6 mg subcutaneously once per day for the first week  Increase in weekly intervals by 0 6 mg until a dose of 3 mg is reached  -     Insulin Pen Needle (BD Pen Needle Micro U/F) 32G X 6 MM MISC; Use once daily  Patient has made dietary changes  Patient has not increased exercise  Does have some weight gain since last visit  Patient wishes to try medication  I recommended Saxenda  Potential side effects of medication discussed  Patient denies history of pancreatitis  Patient denies personal or family history of thyroid cancer or multiple endocrine neoplasia  She is agreeable to trying the medication  Patient tells me she may pursue surgery if medication is not helpful  I stressed the importance of diet and exercise to help with weight loss  Mixed hyperlipidemia  Should improve with weight loss  Uncontrolled hypertension  Should improve with weight loss  Goals:  Do not skip any meals! Food log (ie ) www myfitnesspal com,sparkpeople  com,loseit com,calorieking  com,etc  baritastic (use skinnytaste  com, dietdoctor  com or smartphone [employer] for recipes)  No sugary beverages  At least 64oz of water daily  Increase physical activity by 10 minutes daily  Gradually increase physical activity to a goal of 5 days per week for 30 minutes of MODERATE intensity PLUS 2 days per week of FULL BODY resistance training (use smartphone apps FitON, Home Workout, etc )  Goal protein 100g per day   5526-1049 calories per day    Total time spent: 30 minutes with >50% face-to-face time with the patient  Follow up in approximately 3 months with Non-Surgical Physician/Advanced Practitioner      Subjective:   Chief Complaint   Patient presents with    Follow-up     MWM 6-8 wk f/u        Patient ID: Wanda Sam  is a 34 y o  female with excess weight/obesity here to pursue weight management  Patient is pursuing [facility]    Most recent notes and records were reviewed  Patient presents for medical weight management follow-up  Has changed her diet since last visit  Has not increased her exercise  She would be interested in trying medication and if that does not work she will pursue surgery  In the interim since her last visit she has been started on antihypertensive treatment by her PCP  -Initial weight loss goal of 5-10% weight loss for improved health  Wt Readings from Last 10 Encounters:   06/17/22 118 kg (261 lb 1 6 oz)   05/31/22 118 kg (260 lb)   04/29/22 119 kg (262 lb 3 2 oz)   04/22/22 116 kg (255 lb 8 oz)   02/11/22 115 kg (254 lb)   01/12/22 114 kg (252 lb)   10/09/20 104 kg (229 lb)   08/14/20 108 kg (238 lb 3 2 oz)   07/14/20 113 kg (250 lb)   06/30/20 114 kg (252 lb 6 4 oz)     Initial weight: 255 5lbs  Current weight: 261 1lbs  Change in weight: +5 6lbs      B- skips or eggs or protein shake  S- no  L- Rice w/ chicken (changed to more chicken than rice  No more Octavio's)  S- no  D- Rice with chicken (changed to more chicken than rice for dinner as well)  S- no  Drinks- 4 bottles water daily, cut back on soda  Alcohol- 1 drink per wk  Exercise- just activity at work      The following portions of the patient's history were reviewed and updated as appropriate: allergies, current medications, past family history, past medical history, past social history, past surgical history, and problem list     Review of Systems   Constitutional: Negative for fever  Respiratory: Negative for shortness of breath  Cardiovascular: Negative for chest pain         Objective:  /86 (BP Location: Left arm, Patient Position: Sitting, Cuff Size: Standard)   Pulse 69   Temp 97 8 °F (36 6 °C) (Tympanic)   Ht 5' 2" (1 575 m)   Wt 118 kg (261 lb 1 6 oz)   Breastfeeding No   BMI 47 76 kg/m²   Constitutional: Well-developed, well-nourished and Obese Body mass index is 47 76 kg/m²  [employer] HEENT: No conjunctival injection  Pulmonary: No increased work of breathing or signs of respiratory distress  CV: Well-perfused  GI: Obese  Non-distended  MSK: No edema   Neuro: Oriented to person, place and time  Normal Speech  Normal gait  Psych: Normal affect and mood       Labs and Imaging  Most recent labs and imaging reviewed

## 2022-06-22 ENCOUNTER — TELEPHONE (OUTPATIENT)
Dept: BARIATRICS | Facility: CLINIC | Age: 32
End: 2022-06-22

## 2022-06-22 NOTE — TELEPHONE ENCOUNTER
[facility] called stating that the patient's Rashard Greulich is not covered by her insurance and no other alternatives were given for a choice

## 2022-06-29 ENCOUNTER — OFFICE VISIT (OUTPATIENT)
Dept: FAMILY MEDICINE CLINIC | Facility: CLINIC | Age: 32
End: 2022-06-29
Payer: COMMERCIAL

## 2022-06-29 VITALS
SYSTOLIC BLOOD PRESSURE: 140 MMHG | BODY MASS INDEX: 48.03 KG/M2 | OXYGEN SATURATION: 95 % | HEIGHT: 62 IN | WEIGHT: 261 LBS | DIASTOLIC BLOOD PRESSURE: 90 MMHG | TEMPERATURE: 97.7 F | HEART RATE: 64 BPM

## 2022-06-29 DIAGNOSIS — I10 UNCONTROLLED HYPERTENSION: Primary | ICD-10-CM

## 2022-06-29 PROCEDURE — 1036F TOBACCO NON-USER: CPT | Performed by: FAMILY MEDICINE

## 2022-06-29 PROCEDURE — 3077F SYST BP >= 140 MM HG: CPT | Performed by: FAMILY MEDICINE

## 2022-06-29 PROCEDURE — 3080F DIAST BP >= 90 MM HG: CPT | Performed by: FAMILY MEDICINE

## 2022-06-29 PROCEDURE — 99213 OFFICE O/P EST LOW 20 MIN: CPT | Performed by: FAMILY MEDICINE

## 2022-06-29 PROCEDURE — 3008F BODY MASS INDEX DOCD: CPT | Performed by: FAMILY MEDICINE

## 2022-06-29 PROCEDURE — 3725F SCREEN DEPRESSION PERFORMED: CPT | Performed by: FAMILY MEDICINE

## 2022-06-29 NOTE — PROGRESS NOTES
Depression Screening and Follow-up Plan: Patient was screened for depression during today's encounter  They screened negative with a PHQ-2 score of 0  Subjective:   Chief Complaint   Patient presents with    Follow-up     Chronic conditions        Patient ID: Garland Sands is a 34 y o  female  Patient here follow-up with the hypertension we increase her blood pressure medication to losartan -25 mg once a day patient has been compliant with the medication tolerated well blood pressure improved compared with before and she is asymptomatic      The following portions of the patient's history were reviewed and updated as appropriate: allergies, current medications, past family history, past medical history, past social history, past surgical history and problem list     Review of Systems   Constitutional: Negative for activity change, appetite change, fatigue and fever  HENT: Negative for congestion, ear pain, sinus pressure, sinus pain and sore throat  Eyes: Negative for pain, discharge, redness and itching  Respiratory: Negative for cough, chest tightness, shortness of breath and stridor  Cardiovascular: Negative for chest pain, palpitations and leg swelling  Gastrointestinal: Negative for abdominal pain, blood in stool, constipation, diarrhea and nausea  Genitourinary: Negative for dysuria, flank pain, frequency and hematuria  Musculoskeletal: Negative for back pain, joint swelling and neck pain  Skin: Negative for pallor and rash  Neurological: Negative for dizziness, tremors, weakness, numbness and headaches  Hematological: Does not bruise/bleed easily  Objective:  Vitals:    06/29/22 1023   BP: 140/90   Pulse: 64   Temp: 97 7 °F (36 5 °C)   TempSrc: Tympanic   SpO2: 95%   Weight: 118 kg (261 lb)   Height: 5' 2 25" (1 581 m)      Physical Exam  Vitals and nursing note reviewed  Constitutional:       General: She is not in acute distress       Appearance: She is well-developed  HENT:      Head: Normocephalic and atraumatic  Nose: No congestion or rhinorrhea  Mouth/Throat:      Pharynx: Oropharynx is clear  Eyes:      General:         Right eye: No discharge  Left eye: No discharge  Conjunctiva/sclera: Conjunctivae normal    Neck:      Vascular: No JVD  Cardiovascular:      Rate and Rhythm: Normal rate and regular rhythm  Heart sounds: Normal heart sounds  No murmur heard  No friction rub  No gallop  Pulmonary:      Effort: Pulmonary effort is normal       Breath sounds: Normal breath sounds  Abdominal:      General: Bowel sounds are normal       Palpations: Abdomen is soft  Tenderness: There is no abdominal tenderness  Neurological:      Mental Status: She is oriented to person, place, and time             Assessment/Plan:    Uncontrolled hypertension  Chronic asymptomatic improve in the blood pressure compared with before encouraged patient to continue with the losartan -25 mg once a day discussed important lose weight will re-evaluate the and 6 week       Diagnoses and all orders for this visit:    Uncontrolled hypertension

## 2022-06-29 NOTE — ASSESSMENT & PLAN NOTE
Chronic asymptomatic improve in the blood pressure compared with before encouraged patient to continue with the losartan -25 mg once a day discussed important lose weight will re-evaluate the and 6 week

## 2022-07-01 ENCOUNTER — TELEPHONE (OUTPATIENT)
Dept: BARIATRICS | Facility: CLINIC | Age: 32
End: 2022-07-01

## 2022-07-01 NOTE — TELEPHONE ENCOUNTER
Patient called and stated that Silvia Nunez is not covered by her insurance PA was done and denied the patient would like to know if she could have something other then Silvia Nunez

## 2022-07-05 NOTE — TELEPHONE ENCOUNTER
Due to hypertension I am going to avoid Wellbutrin and phentermine  If no history of kidney stones or glaucoma patient can try Topamax  If she wants to try Topamax she will need to read and sign the Topamax consent form  She will also need a pregnancy test   The potential side effects of topiramate may include numbness or tingling, fatigue, upper respiratory infection symptoms, depression/anxiety, changes in taste, confusion, abdominal upset/heartburn, and trouble sleeping  Notify the provider with any changes in mood  ER with thoughts of harming yourself/others  Notify the provider with any changes in vision  Please let me know how patient would like to proceed

## 2022-08-03 ENCOUNTER — TELEPHONE (OUTPATIENT)
Dept: BARIATRICS | Facility: CLINIC | Age: 32
End: 2022-08-03

## 2022-08-03 NOTE — TELEPHONE ENCOUNTER
Patient called stating that she is not comfortable with the side affects of the Topamax  She is wondering if there is another med that she can do

## 2022-08-04 NOTE — TELEPHONE ENCOUNTER
Patient was informed and also told if her BP gets better controlled the option of changing the med will become a possibility per Dr Crow Ge

## 2022-08-04 NOTE — TELEPHONE ENCOUNTER
Based on previous messages injectable medication is not covered  Patient has hypertension therefore I am going to avoid Wellbutrin and phentermine  At this time Topamax would be a good option for patient  Unfortunately Topamax as well as many if not all medications are not without their side effects

## 2022-08-11 ENCOUNTER — CLINICAL SUPPORT (OUTPATIENT)
Dept: BARIATRICS | Facility: CLINIC | Age: 32
End: 2022-08-11
Payer: COMMERCIAL

## 2022-08-11 DIAGNOSIS — R63.5 ABNORMAL WEIGHT GAIN: Primary | ICD-10-CM

## 2022-08-11 LAB — SL AMB POCT URINE HCG: NEGATIVE

## 2022-08-11 PROCEDURE — 81025 URINE PREGNANCY TEST: CPT

## 2022-08-11 PROCEDURE — RECHECK

## 2022-08-12 ENCOUNTER — TELEPHONE (OUTPATIENT)
Dept: BARIATRICS | Facility: CLINIC | Age: 32
End: 2022-08-12

## 2022-08-12 DIAGNOSIS — E66.01 MORBID OBESITY WITH BMI OF 40.0-44.9, ADULT (HCC): Primary | ICD-10-CM

## 2022-08-12 NOTE — TELEPHONE ENCOUNTER
----- Message from Jose Byrnes, DO sent at 8/12/2022 11:12 AM EDT -----  I do see that patient had a negative urine pregnancy test   I do not see a Topamax consent in her chart  Did she complete one when she provided the urine sample?

## 2022-08-24 ENCOUNTER — OFFICE VISIT (OUTPATIENT)
Dept: FAMILY MEDICINE CLINIC | Facility: CLINIC | Age: 32
End: 2022-08-24
Payer: COMMERCIAL

## 2022-08-24 VITALS
WEIGHT: 263 LBS | HEIGHT: 64 IN | OXYGEN SATURATION: 98 % | DIASTOLIC BLOOD PRESSURE: 100 MMHG | BODY MASS INDEX: 44.9 KG/M2 | TEMPERATURE: 98.3 F | HEART RATE: 60 BPM | SYSTOLIC BLOOD PRESSURE: 150 MMHG

## 2022-08-24 DIAGNOSIS — I10 UNCONTROLLED HYPERTENSION: Primary | ICD-10-CM

## 2022-08-24 DIAGNOSIS — E66.01 MORBID OBESITY WITH BMI OF 45.0-49.9, ADULT (HCC): ICD-10-CM

## 2022-08-24 DIAGNOSIS — R00.2 PALPITATION: ICD-10-CM

## 2022-08-24 DIAGNOSIS — E78.2 MIXED HYPERLIPIDEMIA: ICD-10-CM

## 2022-08-24 PROCEDURE — 3077F SYST BP >= 140 MM HG: CPT | Performed by: FAMILY MEDICINE

## 2022-08-24 PROCEDURE — 93000 ELECTROCARDIOGRAM COMPLETE: CPT | Performed by: FAMILY MEDICINE

## 2022-08-24 PROCEDURE — 3080F DIAST BP >= 90 MM HG: CPT | Performed by: FAMILY MEDICINE

## 2022-08-24 PROCEDURE — 99214 OFFICE O/P EST MOD 30 MIN: CPT | Performed by: FAMILY MEDICINE

## 2022-08-24 PROCEDURE — 3725F SCREEN DEPRESSION PERFORMED: CPT | Performed by: FAMILY MEDICINE

## 2022-08-24 RX ORDER — NIFEDIPINE 30 MG/1
30 TABLET, EXTENDED RELEASE ORAL DAILY
Qty: 30 TABLET | Refills: 2 | Status: SHIPPED | OUTPATIENT
Start: 2022-08-24 | End: 2022-09-21

## 2022-08-25 RX ORDER — TOPIRAMATE 25 MG/1
TABLET ORAL
Qty: 60 TABLET | Refills: 2 | Status: SHIPPED | OUTPATIENT
Start: 2022-08-25 | End: 2022-09-29

## 2022-08-25 NOTE — TELEPHONE ENCOUNTER
Consent reviewed and signed  Handed to front staff to scan into epic  Recent pregnancy test negative  Rx sent to pharmacy on file

## 2022-08-27 NOTE — ASSESSMENT & PLAN NOTE
A new diagnosis symptomatic patient history here for morbid obesity and control hypertension a EKG in the office review with the patient  A plan a blood work to check TSH CBC also echocardiogram to rule out valvular disease and Holter monitor

## 2022-08-27 NOTE — PROGRESS NOTES
Subjective:   Chief Complaint   Patient presents with    Palpitations        Patient ID: Chencho Odonnell is a 34 y o  female  The patient here in the office concerned about palpitation she felt her heart beating in her chest the 2 days ago deny any chest pain no dyspnea on exertion no lower extremity edema no dizziness a she been sleeping well no increase in the caffeine blood pressure today uncontrolled despite patient has been taking her medication as recommended      The following portions of the patient's history were reviewed and updated as appropriate: allergies, current medications, past family history, past medical history, past social history, past surgical history and problem list     Review of Systems   Constitutional: Negative for chills and fever  HENT: Negative for ear pain and sore throat  Eyes: Negative for pain and visual disturbance  Respiratory: Negative for cough and shortness of breath  Cardiovascular: Positive for palpitations  Negative for chest pain  Gastrointestinal: Negative for abdominal pain and vomiting  Genitourinary: Negative for dysuria and hematuria  Musculoskeletal: Negative for arthralgias and back pain  Skin: Negative for color change and rash  Neurological: Negative for seizures and syncope  All other systems reviewed and are negative  Objective:  Vitals:    08/24/22 1028   BP: 150/100   Pulse: 60   Temp: 98 3 °F (36 8 °C)   TempSrc: Tympanic   SpO2: 98%   Weight: 119 kg (263 lb)   Height: 5' 4" (1 626 m)      Physical Exam  Constitutional:       General: She is not in acute distress  Appearance: She is well-developed  HENT:      Head: Normocephalic and atraumatic  Eyes:      Conjunctiva/sclera: Conjunctivae normal    Neck:      Vascular: No JVD  Cardiovascular:      Rate and Rhythm: Normal rate and regular rhythm  Heart sounds: Normal heart sounds  No murmur heard  No friction rub  No gallop     Pulmonary: Effort: Pulmonary effort is normal       Breath sounds: Normal breath sounds  Abdominal:      General: Bowel sounds are normal       Palpations: Abdomen is soft  Tenderness: There is no abdominal tenderness  Musculoskeletal:      Right lower leg: No edema  Left lower leg: No edema  Skin:     Findings: No erythema or rash  Neurological:      Mental Status: She is oriented to person, place, and time  Assessment/Plan:    Uncontrolled hypertension  Uncontrolled patient currently on losartan -25 mg once a day a patient compliant take it daily will add the nifedipine 30 mg once a day proper use and possible side effect discussed the patient low-salt diet review    Palpitation  A new diagnosis symptomatic patient history here for morbid obesity and control hypertension a EKG in the office review with the patient  A plan a blood work to check TSH CBC also echocardiogram to rule out valvular disease and Holter monitor       Diagnoses and all orders for this visit:    Uncontrolled hypertension  -     NIFEdipine (PROCARDIA XL) 30 mg 24 hr tablet; Take 1 tablet (30 mg total) by mouth daily  -     Holter monitor; Future  -     Echo complete w/ contrast if indicated; Future  -     Comprehensive metabolic panel; Future    Palpitation  -     Holter monitor; Future  -     Echo complete w/ contrast if indicated; Future  -     CBC and differential; Future  -     POCT ECG    Mixed hyperlipidemia  -     Lipid panel; Future    Morbid obesity with BMI of 45 0-49 9, adult ([facility])  -     TSH, 3rd generation with Free T4 reflex;  Future

## 2022-08-27 NOTE — ASSESSMENT & PLAN NOTE
Uncontrolled patient currently on losartan -25 mg once a day a patient compliant take it daily will add the nifedipine 30 mg once a day proper use and possible side effect discussed the patient low-salt diet review

## 2022-09-07 ENCOUNTER — HOSPITAL ENCOUNTER (OUTPATIENT)
Dept: NON INVASIVE DIAGNOSTICS | Facility: HOSPITAL | Age: 32
Discharge: HOME | End: 2022-09-07
Payer: COMMERCIAL

## 2022-09-07 VITALS
WEIGHT: 263 LBS | HEIGHT: 64 IN | BODY MASS INDEX: 44.9 KG/M2 | DIASTOLIC BLOOD PRESSURE: 100 MMHG | SYSTOLIC BLOOD PRESSURE: 150 MMHG | HEART RATE: 67 BPM

## 2022-09-07 DIAGNOSIS — R00.2 PALPITATION: ICD-10-CM

## 2022-09-07 DIAGNOSIS — I10 UNCONTROLLED HYPERTENSION: ICD-10-CM

## 2022-09-07 LAB
AORTIC ROOT: 2.7 CM
APICAL FOUR CHAMBER EJECTION FRACTION: 67 %
E WAVE DECELERATION TIME: 242 MS
FRACTIONAL SHORTENING: 38 (ref 28–44)
INTERVENTRICULAR SEPTUM IN DIASTOLE (PARASTERNAL SHORT AXIS VIEW): 1.6 CM
INTERVENTRICULAR SEPTUM: 1.6 CM (ref 0.6–1.1)
LAAS-AP2: 22.8 CM2
LAAS-AP4: 20 CM2
LEFT ATRIUM AREA SYSTOLE SINGLE PLANE A4C: 20 CM2
LEFT ATRIUM SIZE: 4.6 CM
LEFT INTERNAL DIMENSION IN SYSTOLE: 2.6 CM (ref 2.1–4)
LEFT VENTRICULAR INTERNAL DIMENSION IN DIASTOLE: 4.2 CM (ref 3.5–6)
LEFT VENTRICULAR POSTERIOR WALL IN END DIASTOLE: 1.7 CM
LEFT VENTRICULAR STROKE VOLUME: 51 ML
LVSV (TEICH): 51 ML
MV E'TISSUE VEL-SEP: 11 CM/S
MV PEAK A VEL: 0.65 M/S
MV PEAK E VEL: 110 CM/S
MV STENOSIS PRESSURE HALF TIME: 70 MS
MV VALVE AREA P 1/2 METHOD: 3.14
RA PRESSURE ESTIMATED: 3 MMHG
RIGHT ATRIUM AREA SYSTOLE A4C: 12.5 CM2
RIGHT VENTRICLE ID DIMENSION: 3.4 CM
RV PSP: 22 MMHG
SL CV LEFT ATRIUM LENGTH A2C: 5.6 CM
SL CV LV EF: 65
SL CV PED ECHO LEFT VENTRICLE DIASTOLIC VOLUME (MOD BIPLANE) 2D: 77 ML
SL CV PED ECHO LEFT VENTRICLE SYSTOLIC VOLUME (MOD BIPLANE) 2D: 25 ML
TR MAX PG: 19 MMHG
TR PEAK VELOCITY: 2.2 M/S
TRICUSPID VALVE PEAK REGURGITATION VELOCITY: 2.17 M/S

## 2022-09-07 PROCEDURE — 93226 XTRNL ECG REC<48 HR SCAN A/R: CPT

## 2022-09-07 PROCEDURE — 93225 XTRNL ECG REC<48 HRS REC: CPT

## 2022-09-07 PROCEDURE — 93306 TTE W/DOPPLER COMPLETE: CPT

## 2022-09-13 PROCEDURE — 93227 XTRNL ECG REC<48 HR R&I: CPT | Performed by: INTERNAL MEDICINE

## 2022-09-28 ENCOUNTER — LAB (OUTPATIENT)
Dept: LAB | Facility: HOSPITAL | Age: 32
End: 2022-09-28
Payer: COMMERCIAL

## 2022-09-28 ENCOUNTER — OFFICE VISIT (OUTPATIENT)
Dept: FAMILY MEDICINE CLINIC | Facility: CLINIC | Age: 32
End: 2022-09-28
Payer: COMMERCIAL

## 2022-09-28 VITALS
BODY MASS INDEX: 46.78 KG/M2 | HEIGHT: 63 IN | TEMPERATURE: 98.5 F | SYSTOLIC BLOOD PRESSURE: 134 MMHG | HEART RATE: 60 BPM | DIASTOLIC BLOOD PRESSURE: 88 MMHG | WEIGHT: 264 LBS | OXYGEN SATURATION: 99 %

## 2022-09-28 DIAGNOSIS — E78.2 MIXED HYPERLIPIDEMIA: ICD-10-CM

## 2022-09-28 DIAGNOSIS — E66.01 MORBID OBESITY WITH BMI OF 45.0-49.9, ADULT (HCC): ICD-10-CM

## 2022-09-28 DIAGNOSIS — I10 UNCONTROLLED HYPERTENSION: ICD-10-CM

## 2022-09-28 DIAGNOSIS — I10 UNCONTROLLED HYPERTENSION: Primary | ICD-10-CM

## 2022-09-28 DIAGNOSIS — R00.2 PALPITATION: ICD-10-CM

## 2022-09-28 LAB
ALBUMIN SERPL BCP-MCNC: 3.9 G/DL (ref 3.5–5)
ALP SERPL-CCNC: 70 U/L (ref 43–122)
ALT SERPL W P-5'-P-CCNC: 27 U/L
ANION GAP SERPL CALCULATED.3IONS-SCNC: 8 MMOL/L (ref 5–14)
AST SERPL W P-5'-P-CCNC: 88 U/L (ref 14–36)
BASOPHILS # BLD AUTO: 0.02 THOUSANDS/ΜL (ref 0–0.1)
BASOPHILS NFR BLD AUTO: 0 % (ref 0–1)
BILIRUB SERPL-MCNC: 0.36 MG/DL (ref 0.2–1)
BUN SERPL-MCNC: 11 MG/DL (ref 5–25)
CALCIUM SERPL-MCNC: 9.2 MG/DL (ref 8.4–10.2)
CHLORIDE SERPL-SCNC: 108 MMOL/L (ref 96–108)
CHOLEST SERPL-MCNC: 180 MG/DL
CO2 SERPL-SCNC: 24 MMOL/L (ref 21–32)
CREAT SERPL-MCNC: 0.89 MG/DL (ref 0.6–1.2)
EOSINOPHIL # BLD AUTO: 0.06 THOUSAND/ΜL (ref 0–0.61)
EOSINOPHIL NFR BLD AUTO: 1 % (ref 0–6)
ERYTHROCYTE [DISTWIDTH] IN BLOOD BY AUTOMATED COUNT: 14.3 % (ref 11.6–15.1)
GFR SERPL CREATININE-BSD FRML MDRD: 85 ML/MIN/1.73SQ M
GLUCOSE P FAST SERPL-MCNC: 97 MG/DL (ref 70–99)
HCT VFR BLD AUTO: 40 % (ref 34.8–46.1)
HDLC SERPL-MCNC: 62 MG/DL
HGB BLD-MCNC: 12.2 G/DL (ref 11.5–15.4)
IMM GRANULOCYTES # BLD AUTO: 0.01 THOUSAND/UL (ref 0–0.2)
IMM GRANULOCYTES NFR BLD AUTO: 0 % (ref 0–2)
LDLC SERPL CALC-MCNC: 102 MG/DL (ref 0–100)
LYMPHOCYTES # BLD AUTO: 2.13 THOUSANDS/ΜL (ref 0.6–4.47)
LYMPHOCYTES NFR BLD AUTO: 36 % (ref 14–44)
MCH RBC QN AUTO: 27.5 PG (ref 26.8–34.3)
MCHC RBC AUTO-ENTMCNC: 30.5 G/DL (ref 31.4–37.4)
MCV RBC AUTO: 90 FL (ref 82–98)
MONOCYTES # BLD AUTO: 0.38 THOUSAND/ΜL (ref 0.17–1.22)
MONOCYTES NFR BLD AUTO: 6 % (ref 4–12)
NEUTROPHILS # BLD AUTO: 3.32 THOUSANDS/ΜL (ref 1.85–7.62)
NEUTS SEG NFR BLD AUTO: 57 % (ref 43–75)
NONHDLC SERPL-MCNC: 118 MG/DL
NRBC BLD AUTO-RTO: 0 /100 WBCS
PLATELET # BLD AUTO: 272 THOUSANDS/UL (ref 149–390)
PMV BLD AUTO: 10 FL (ref 8.9–12.7)
POTASSIUM SERPL-SCNC: 4.3 MMOL/L (ref 3.5–5.3)
PROT SERPL-MCNC: 7.2 G/DL (ref 6.4–8.4)
RBC # BLD AUTO: 4.43 MILLION/UL (ref 3.81–5.12)
SODIUM SERPL-SCNC: 140 MMOL/L (ref 135–147)
TRIGL SERPL-MCNC: 81 MG/DL
TSH SERPL DL<=0.05 MIU/L-ACNC: 0.74 UIU/ML (ref 0.45–4.5)
WBC # BLD AUTO: 5.92 THOUSAND/UL (ref 4.31–10.16)

## 2022-09-28 PROCEDURE — 85025 COMPLETE CBC W/AUTO DIFF WBC: CPT

## 2022-09-28 PROCEDURE — 80061 LIPID PANEL: CPT

## 2022-09-28 PROCEDURE — 84443 ASSAY THYROID STIM HORMONE: CPT

## 2022-09-28 PROCEDURE — 36415 COLL VENOUS BLD VENIPUNCTURE: CPT

## 2022-09-28 PROCEDURE — 99214 OFFICE O/P EST MOD 30 MIN: CPT | Performed by: FAMILY MEDICINE

## 2022-09-28 PROCEDURE — 80053 COMPREHEN METABOLIC PANEL: CPT

## 2022-09-28 NOTE — PROGRESS NOTES
Name: Augustus Beck      : 1990      MRN: [id number]  Encounter Provider: Lynn Sierra MD  Encounter Date: 2022   Encounter department: [facility]     1  Uncontrolled hypertension  Assessment & Plan:  Chronic asymptomatic fair control since the patient start nifedipine patient tolerated well without side effect  Continue current management including nifedipine 30 mg losartan HCT low-salt diet important lose weight review      2  Mixed hyperlipidemia  Assessment & Plan:  A chronic asymptomatic recent blood work lipid panel will control continue low-fat diet      3  Morbid obesity with BMI of 45 0-49 9, adult ([facility] [address] )  Assessment & Plan:  A chronic uncontrolled patient recently evaluated by weight  on Topamax a discussed portion control low carb low-fat diet and increased physical activity             Subjective      The patient here follow-up with a chronic condition patient compliant with the medication tolerated well a no new concern recent blood work review    Review of Systems   Constitutional: Negative for chills and fever  HENT: Negative for ear pain and sore throat  Eyes: Negative for pain and visual disturbance  Respiratory: Negative for cough and shortness of breath  Cardiovascular: Negative for chest pain and palpitations  Gastrointestinal: Negative for abdominal pain and vomiting  Genitourinary: Negative for dysuria and hematuria  Musculoskeletal: Negative for arthralgias and back pain  Skin: Negative for color change and rash  Neurological: Negative for seizures and syncope  All other systems reviewed and are negative        Current Outpatient Medications on File Prior to Visit   Medication Sig    topiramate (Topamax) 25 mg tablet Take 1 tab po qhs x1wk then if tolerated increase to 2 tabs po qhs    losartan-hydrochlorothiazide (HYZAAR) 100-25 MG per tablet Take 1 tablet by mouth daily  NIFEdipine (PROCARDIA XL) 30 mg 24 hr tablet TAKE 1 TABLET BY MOUTH EVERY DAY       Objective     /88   Pulse 60   Temp 98 5 °F (36 9 °C) (Tympanic)   Ht 5' 3" (1 6 m)   Wt 120 kg (264 lb)   LMP 09/07/2022 (Exact Date)   SpO2 99%   Breastfeeding No   BMI 46 77 kg/m²     Physical Exam  Vitals and nursing note reviewed  Constitutional:       General: She is not in acute distress  Appearance: She is well-developed  HENT:      Head: Normocephalic and atraumatic  Nose: Nose normal       Mouth/Throat:      Pharynx: Oropharynx is clear  Eyes:      Conjunctiva/sclera: Conjunctivae normal    Neck:      Vascular: No JVD  Cardiovascular:      Rate and Rhythm: Normal rate and regular rhythm  Heart sounds: Normal heart sounds  No murmur heard  No friction rub  No gallop  Pulmonary:      Effort: Pulmonary effort is normal       Breath sounds: Normal breath sounds  Abdominal:      General: Bowel sounds are normal       Palpations: Abdomen is soft  Tenderness: There is no abdominal tenderness  Musculoskeletal:      Right lower leg: No edema  Left lower leg: No edema  Skin:     Findings: No erythema or rash  Neurological:      Mental Status: She is oriented to person, place, and time     Psychiatric:         Mood and Affect: Mood normal        Lisandro Garduno MD

## 2022-09-28 NOTE — ASSESSMENT & PLAN NOTE
Chronic asymptomatic fair control since the patient start nifedipine patient tolerated well without side effect  Continue current management including nifedipine 30 mg losartan HCT low-salt diet important lose weight review

## 2022-09-28 NOTE — ASSESSMENT & PLAN NOTE
A chronic uncontrolled patient recently evaluated by weight  on Topamax a discussed portion control low carb low-fat diet and increased physical activity

## 2022-09-29 ENCOUNTER — OFFICE VISIT (OUTPATIENT)
Dept: BARIATRICS | Facility: CLINIC | Age: 32
End: 2022-09-29
Payer: COMMERCIAL

## 2022-09-29 ENCOUNTER — TELEPHONE (OUTPATIENT)
Dept: FAMILY MEDICINE CLINIC | Facility: CLINIC | Age: 32
End: 2022-09-29

## 2022-09-29 VITALS
WEIGHT: 263.2 LBS | RESPIRATION RATE: 16 BRPM | SYSTOLIC BLOOD PRESSURE: 138 MMHG | BODY MASS INDEX: 46.64 KG/M2 | DIASTOLIC BLOOD PRESSURE: 90 MMHG | HEART RATE: 78 BPM | HEIGHT: 63 IN

## 2022-09-29 DIAGNOSIS — E66.01 MORBID OBESITY WITH BMI OF 40.0-44.9, ADULT (HCC): Primary | ICD-10-CM

## 2022-09-29 DIAGNOSIS — R79.89 ELEVATED LFTS: Primary | ICD-10-CM

## 2022-09-29 PROCEDURE — 99214 OFFICE O/P EST MOD 30 MIN: CPT | Performed by: FAMILY MEDICINE

## 2022-09-29 NOTE — TELEPHONE ENCOUNTER
----- Message from Karis Isaac MD sent at 9/29/2022 10:06 AM EDT -----  A slight elevated in the liver enzyme recommend to repeat ALT AST in 4 week

## 2022-09-29 NOTE — PATIENT INSTRUCTIONS
Goals:  Do not skip any meals! Food log (ie ) www myfitnesspal com,sparkpeople  com,loseit com,calorieking  com,etc  baritastic (use skinnytaste  com, dietdoctor  com or smartphone angela amprice for recipes)  No sugary beverages  At least 64oz of water daily  Increase physical activity by 10 minutes daily  Gradually increase physical activity to a goal of 5 days per week for 30 minutes of MODERATE intensity PLUS 2 days per week of FULL BODY resistance training (use smartphone apps Appoet, Home Workout, etc )   1500 calories per day  Use portion plate diagram as a guide with meals  Make appointment with dietician

## 2022-09-29 NOTE — PROGRESS NOTES
Assessment/Plan:  Sekou Walker was seen today for follow-up  Diagnoses and all orders for this visit:    Morbid obesity with BMI of 40 0-44 9, adult [facility])    Unfortunately patient has gained weight since her last visit  We discussed portion control using the portion plate diagram   While patient is free to schedule a surgical consultation she does seem hesitant about having surgery  I did recommend she meet with the dietitian to help with menu planning and patient was in agreement  Did not tolerate higher dose of Topamax and is only taking 25 mg daily  I recommended stopping the medication and patient was in agreement  She will take 25 mg every other day for two weeks and then stop  Can consider other medications for weight loss during follow-up appointment  Goals:  Do not skip any meals! Food log (ie ) www myfitnesspal com,sparkpeople  com,loseit com,calorieking  com,etc  baritastic (use skinnytaste  com, dietdoctor  com or smartphone angela SANDOW for recipes)  No sugary beverages  At least 64oz of water daily  Increase physical activity by 10 minutes daily  Gradually increase physical activity to a goal of 5 days per week for 30 minutes of MODERATE intensity PLUS 2 days per week of FULL BODY resistance training (use smartphone apps FitON, Home Workout, etc )   1500 calories per day  Use portion plate diagram as a guide with meals  Make appointment with dietician  Total time spent: 30 minutes with >50% face-to-face time with the patient  Follow up in approximately 3 months with Non-Surgical Physician/Advanced Practitioner  Subjective:   Chief Complaint   Patient presents with    Follow-up     MWM 3mth f/u; waist 50in       Patient ID: Mamta Esquivel  is a 34 y o  female with excess weight/obesity here to pursue weight management  Patient is pursuing [facility] Program    Most recent notes and records were reviewed  Patient presents for medical weight management follow-up  Currently on Topamax but only taking 25 mg daily as she was having side effects of dizziness when she was taking 50 mg daily  She is food logging approximately 1500 calories per day  Her PCP suggested she be evaluated for surgical weight loss but patient would like to avoid this if possible  Has not yet met with a dietitian     -Initial weight loss goal of 5-10% weight loss for improved health  Wt Readings from Last 10 Encounters:   09/29/22 119 kg (263 lb 3 2 oz)   09/28/22 120 kg (264 lb)   09/07/22 119 kg (263 lb)   08/24/22 119 kg (263 lb)   06/29/22 118 kg (261 lb)   06/17/22 118 kg (261 lb 1 6 oz)   05/31/22 118 kg (260 lb)   04/29/22 119 kg (262 lb 3 2 oz)   04/22/22 116 kg (255 lb 8 oz)   02/11/22 115 kg (254 lb)     Initial weight: 255 5lbs  Current weight: 263 2lbs (+2 1lbs)  Change in weight: +7 7lbs      B- skips or eggs or protein shake  S- no  L- Rice w/ chicken (changed to more chicken than rice  No more Octavio's)  S- no  D- Rice with chicken (changed to more chicken than rice for dinner as well)  S- no  Drinks- 4 bottles water daily, no longer drinking soda  Alcohol- 1 drink per wk  Exercise- just activity at work      The following portions of the patient's history were reviewed and updated as appropriate: allergies, current medications, past family history, past medical history, past social history, past surgical history, and problem list     Review of Systems   Constitutional: Negative for fever  Respiratory: Negative for shortness of breath  Cardiovascular: Negative for chest pain  Objective:  /90   Pulse 78   Resp 16   Ht 5' 3" (1 6 m)   Wt 119 kg (263 lb 3 2 oz)   LMP 09/07/2022 (Exact Date)   Breastfeeding No   BMI 46 62 kg/m²   Constitutional: Well-developed, well-nourished and Obese Body mass index is 46 62 kg/m²  Karrie Nissen HEENT: No conjunctival injection  Pulmonary: No increased work of breathing or signs of respiratory distress  CV: Well-perfused  GI: Obese  Non-distended  MSK: No edema   Neuro: Oriented to person, place and time  Normal Speech  Normal gait  Psych: Normal affect and mood  Labs and Imaging  Recent labs and imaging have been personally reviewed    Lab Results   Component Value Date    WBC 5 92 09/28/2022    HGB 12 2 09/28/2022    HCT 40 0 09/28/2022    MCV 90 09/28/2022     09/28/2022     Lab Results   Component Value Date    SODIUM 140 09/28/2022    K 4 3 09/28/2022     09/28/2022    CO2 24 09/28/2022    AGAP 8 09/28/2022    BUN 11 09/28/2022    CREATININE 0 89 09/28/2022    GLUF 97 09/28/2022    CALCIUM 9 2 09/28/2022    AST 88 (H) 09/28/2022    ALT 27 09/28/2022    ALKPHOS 70 09/28/2022    TP 7 2 09/28/2022    TBILI 0 36 09/28/2022    EGFR 85 09/28/2022     No results found for: HGBA1C  Lab Results   Component Value Date    AFP7NWLUHINK 0 744 09/28/2022     Lab Results   Component Value Date    CHOLESTEROL 180 09/28/2022     Lab Results   Component Value Date    HDL 62 09/28/2022     Lab Results   Component Value Date    TRIG 81 09/28/2022     Lab Results   Component Value Date    LDLCALC 102 (H) 09/28/2022

## 2022-10-04 NOTE — PROGRESS NOTES
Weight Management Medical Nutrition Assessment          Patient seen by Medical Provider in past 6 months:  {YES/NO:20200}  Requested to schedule appointment with Medical Provider: {YES/NO:22036}      Anthropometric Measurements  Start Weight (#): ***  Current Weight (#): ***  TBW % Change from start weight:***  Ideal Body Weight (#):***  Goal Weight (#):***  Highest:  Lowest:    Weight Loss History  Previous weight loss attempts: {NUT Weight Loss Methods:04025}    Food and Nutrition Related History  Wake up: ***   Bed Time:***    Food Recall  Breakfast:***    Snack:***  Lunch:***  Snack:***  Dinner:***  Snack:***      Beverages: {desc; beverage intake:49969}  Volume of beverage intake: ***    Weekends: {Worse/Same/Improved:34114}  Cravings: ***  Trouble area of day:***    Frequency of Eating out: {FREQUENCY:16501}  Food restrictions:***  Cooking: self   Food Shopping: self    Physical Activity Intake  Activity:{ACTIVITIES/EXERCISE:77900}  Frequency:{desc; exercise frequency:85844}  Physical limitations/barriers to exercise: ***    Estimated Needs  Energy  SECA: BMR:***      X 1 3 -1000 =  Bear Marisa Energy Needs:  BMR : ***   1-2# loss weekly sedentary:  ***             1-2# loss weekly lightly active:***  Maintenance calories for sedentary activity level: ***  Protein:***      (1 2-1 5g/kg IBW)  Fluid: ***     (35mL/kg IBW)    Nutrition Diagnosis  {New PES:90897}    Nutrition Intervention    Nutrition Prescription  Calories:***  Protein:***  Fluid:***    Meal Plan (Song/Pro/Carb)  Breakfast:  Snack:  Lunch:  Snack:  Dinner:  Snack:    Nutrition Education:    Healthy Core Manual  Calorie controlled menu  Lean protein food choices  Healthy snack options  Food journaling tips      Nutrition Counseling:  Strategies: meal planning, portion sizes, healthy snack choices, hydration, fiber intake, protein intake, exercise, food journal      Monitoring and Evaluation:  Evaluation criteria:  Energy Intake  Meet protein needs  Maintain adequate hydration  Monitor weekly weight  Meal planning/preparation  Food journal   Decreased portions at mealtimes and snacks  Physical activity     Barriers to learning:{SL AMB DSMT/MNT BARRIERS TO XGGIVBDO:76545}  Readiness to change: {Readiness to Change:92434}  Comprehension: {PATIENT'S RESPONSE COMPREHENSION:4253245904}  Expected Compliance: {PATIENT'S RESPONSE EXPECTED COMPLIANCE:6242252033}

## 2022-10-07 NOTE — PROGRESS NOTES
Weight Management Medical Nutrition Assessment          Patient seen by Medical Provider in past 6 months:  {YES/NO:20200}  Requested to schedule appointment with Medical Provider: {YES/NO:66907}      Anthropometric Measurements  Start Weight (#): ***  Current Weight (#): ***  TBW % Change from start weight:***  Ideal Body Weight (#):***  Goal Weight (#):***  Highest:  Lowest:    Weight Loss History  Previous weight loss attempts: {NUT Weight Loss Methods:80258}    Food and Nutrition Related History  Wake up: ***   Bed Time:***    Food Recall  Breakfast:***    Snack:***  Lunch:***  Snack:***  Dinner:***  Snack:***      Beverages: {desc; beverage intake:21762}  Volume of beverage intake: ***    Weekends: {Worse/Same/Improved:22748}  Cravings: ***  Trouble area of day:***    Frequency of Eating out: {FREQUENCY:57250}  Food restrictions:***  Cooking: self   Food Shopping: self    Physical Activity Intake  Activity:{ACTIVITIES/EXERCISE:41065}  Frequency:{desc; exercise frequency:07833}  Physical limitations/barriers to exercise: ***    Estimated Needs  Energy  SECA: BMR:***      X 1 3 -1000 =  Norma Ronquillo Energy Needs:  BMR : ***   1-2# loss weekly sedentary:  ***             1-2# loss weekly lightly active:***  Maintenance calories for sedentary activity level: ***  Protein:***      (1 2-1 5g/kg IBW)  Fluid: ***     (35mL/kg IBW)    Nutrition Diagnosis  {New PES:80761}    Nutrition Intervention    Nutrition Prescription  Calories:***  Protein:***  Fluid:***    Meal Plan (Song/Pro/Carb)  Breakfast:  Snack:  Lunch:  Snack:  Dinner:  Snack:    Nutrition Education:    Healthy Core Manual  Calorie controlled menu  Lean protein food choices  Healthy snack options  Food journaling tips      Nutrition Counseling:  Strategies: meal planning, portion sizes, healthy snack choices, hydration, fiber intake, protein intake, exercise, food journal      Monitoring and Evaluation:  Evaluation criteria:  Energy Intake  Meet protein needs  Maintain adequate hydration  Monitor weekly weight  Meal planning/preparation  Food journal   Decreased portions at mealtimes and snacks  Physical activity     Barriers to learning:{SL AMB DSMT/MNT BARRIERS [facility]:71779}  Readiness to change: {Readiness to Change:36336}  Comprehension: {PATIENT'S RESPONSE COMPREHENSION:6477350350}  Expected Compliance: {PATIENT'S RESPONSE EXPECTED COMPLIANCE:5549702205}

## 2022-10-13 ENCOUNTER — OFFICE VISIT (OUTPATIENT)
Dept: BARIATRICS | Facility: CLINIC | Age: 32
End: 2022-10-13

## 2022-10-13 VITALS — WEIGHT: 258.5 LBS | BODY MASS INDEX: 45.79 KG/M2

## 2022-10-13 DIAGNOSIS — R63.5 ABNORMAL WEIGHT GAIN: ICD-10-CM

## 2022-10-13 PROCEDURE — WMDI60

## 2022-10-13 PROCEDURE — RECHECK

## 2022-10-13 NOTE — PROGRESS NOTES
Weight Management Medical Nutrition Assessment  Arturo Harmon is here for her first RD session  Current wt: 258 5 lbs  She has tried medications recommended to her for weight loss but has gotten bad side effects  She started food tracking and wants to continue  She gets an itchy throat from eating fruits but never had this issue when she lived in [place]  It was suggested that she tries organic fruits  She is allergic to eggs when she eats out  If she makes eggs at home, she has no reaction  She struggles to eat in the mid-afternoon and eats big portions in the evening  She forgets to eat during the day because she is so busy, so it was suggested that she sets an alarm as a reminder to eat in the afternoon  It was also suggested that she eats 1/2 cup of brown rice and 1/2 cup of cauliflower rice, 6 oz of lean meat and 1 cup of vegetables at dinner and tries to incorporate a protein shake for the afternoon  Options for follow-up reviewed, she did not wish to make an appointment at this time  Likes: [place] yogurt, string cheese, fish (salmon), eggs when she cooks them  Cottage cheese  Dislikes: Peanut butter, pancakes    Patient seen by Medical Provider in past 6 months:  yes  Requested to schedule appointment with Medical Provider: No      Anthropometric Measurements  Start Weight (#): 255 lbs 8 oz 4/22/22  Current Weight (#):  258 5 lbs 10/13/22  TBW % Change from start weight: 3 5 lbs weight gain (1 4%)  Ideal Body Weight (#): 141 15 lbs BMI 25 (63")  Goal Weight (#): 180-200 lbs   Highest: 264 lbs 9/29/22  Lowest: 150 lbs Mid 20's    Weight Loss History  Previous weight loss attempts: None  Food and Nutrition Related History  Wake up: [place] from 7:00 am - 12 pm  Work schedule effects wake up    Bed Time: 9:00 pm    Food Recall  Breakfast: 10 am-11 am - [address] : burger or fried chicken sandwich   Snack: Skip   Lunch: Skip  Snack: Skip  Dinner: 8pm-9pm - 9 oz chicken/pork 1 1/2 cup of white rice OR brown rice  Snack: Skip      Beverages: water and alcohol on weekends  Volume of beverage intake: 67 oz water, 2-3 cups alcohol on weekends    Weekends: Same  Cravings: Fried foods  Trouble area of day: Evening     Frequency of Eating out: every 2 days  Food restrictions: Latex, Eggs when eating out (OK if she makes them at home), itchy throat with fruits (possinly from pesticides)  Cooking: self   Food Shopping: self    Physical Activity Intake  Activity: Walking at work   Frequency: 5 days a week  Physical limitations/barriers to exercise: N/a    Estimated Needs  Energy  Bear Marisa Energy Needs:  BMR : 1,847   1-2# loss weekly sedentary: 1,216 - 1,716             1-2# loss weekly lightly active: 1,539 - 2,039  Maintenance calories for sedentary activity level: 2,216  Protein: 77 - 96 gm  (1 2-1 5g/kg IBW)  Fluid: 75 oz  (35mL/kg IBW)    Nutrition Diagnosis  Yes;    Abnormal weight gain related to excessive eating in the evening as evidenced by BMI >40       Nutrition Intervention    Nutrition Prescription  Calories:8478-5254  Protein:  grams  Fluid: 75 oz    Meal Plan (Song/Pro/Carb)  Breakfast: 250-350, 20-25  Snack: 100-150, 5-10  Lunch: 225-300, 25-30  Snack: Skip  Dinner: 550-600, 40-45  Snack: 100-150, 5-10    Nutrition Education:    Protein bars  Calorie controlled menu  Lean protein food choices  Healthy snack options  Food journaling tips      Nutrition Counseling:  Strategies: meal planning, portion sizes, healthy snack choices, hydration, fiber intake, protein intake, exercise, food journal      Monitoring and Evaluation:  Evaluation criteria:  Energy Intake  Meet protein needs  Maintain adequate hydration  Monitor weekly weight  Meal planning/preparation  Food journal   Decreased portions at mealtimes and snacks  Physical activity     Barriers to learning:none  Readiness to change: Contemplation  Comprehension: very good  Expected Compliance: good

## 2023-01-13 ENCOUNTER — OFFICE VISIT (OUTPATIENT)
Dept: FAMILY MEDICINE CLINIC | Facility: CLINIC | Age: 33
End: 2023-01-13

## 2023-01-13 ENCOUNTER — APPOINTMENT (OUTPATIENT)
Dept: LAB | Facility: HOSPITAL | Age: 33
End: 2023-01-13

## 2023-01-13 VITALS
DIASTOLIC BLOOD PRESSURE: 90 MMHG | OXYGEN SATURATION: 99 % | HEIGHT: 63 IN | WEIGHT: 262 LBS | TEMPERATURE: 99.2 F | BODY MASS INDEX: 46.42 KG/M2 | SYSTOLIC BLOOD PRESSURE: 140 MMHG | HEART RATE: 71 BPM

## 2023-01-13 DIAGNOSIS — I10 UNCONTROLLED HYPERTENSION: ICD-10-CM

## 2023-01-13 DIAGNOSIS — R79.89 ELEVATED LFTS: ICD-10-CM

## 2023-01-13 DIAGNOSIS — Z00.01 ENCOUNTER FOR WELL ADULT EXAM WITH ABNORMAL FINDINGS: Primary | ICD-10-CM

## 2023-01-13 DIAGNOSIS — E66.01 MORBID OBESITY WITH BMI OF 45.0-49.9, ADULT (HCC): ICD-10-CM

## 2023-01-13 LAB
ALT SERPL W P-5'-P-CCNC: 26 U/L (ref 12–78)
AST SERPL W P-5'-P-CCNC: 23 U/L (ref 5–45)

## 2023-01-13 NOTE — ASSESSMENT & PLAN NOTE
Uncontrolled secondary patient has not been taking the medication regularly discussed the patient with an compliant with the medication and take it daily and complication of uncontrolled blood pressure plan to recheck it in 2-week low-salt diet and important lose weight reviewed

## 2023-01-13 NOTE — PROGRESS NOTES
[address] PRIMARY CARE [facility]    NAME: Lynn Michel  AGE: 34 y o  SEX: female  : 1990     DATE: 2023     Assessment and Plan:     Problem List Items Addressed This Visit        Cardiovascular and Mediastinum    Uncontrolled hypertension     Uncontrolled secondary patient has not been taking the medication regularly discussed the patient with an compliant with the medication and take it daily and complication of uncontrolled blood pressure plan to recheck it in 2-week low-salt diet and important lose weight reviewed            Other    Morbid obesity with BMI of 45 0-49 9, adult ([address] )     Chronic and controlled with BMI 46 41 I discussed with patient and bilateral action and she declined need for today as she will think about it discussed portion control low-carb low-fat diet and increase activity         Encounter for well adult exam with abnormal findings - Primary     Advice and education were given regarding nutrition, aerobic exercises, weight-bearing exercises, cardiovascular risk reduction, fall risk reduction, and age-appropriate supplements  The patient was counseled regarding instructions for management, risk factor reductions, prognosis, risks and benefits of treatment options, patient and family education, and importance of compliance with treatment  Immunizations and preventive care screenings were discussed with patient today  Appropriate education was printed on patient's after visit summary  Counseling:  Alcohol/drug use: discussed moderation in alcohol intake, the recommendations for healthy alcohol use, and avoidance of illicit drug use  Dental Health: discussed importance of regular tooth brushing, flossing, and dental visits  Injury prevention: discussed safety/seat belts, safety helmets, smoke detectors, carbon dioxide detectors, and smoking near bedding or upholstery    Sexual health: discussed sexually transmitted diseases, partner selection, use of condoms, avoidance of unintended pregnancy, and contraceptive alternatives  Exercise: the importance of regular exercise/physical activity was discussed  Recommend exercise 3-5 times per week for at least 30 minutes  BMI Counseling: Body mass index is 46 41 kg/m²  The BMI is above normal  Nutrition recommendations include decreasing portion sizes, decreasing fast food intake and limiting drinks that contain sugar  Exercise recommendations include exercising 3-5 times per week  No pharmacotherapy was ordered  Rationale for BMI follow-up plan is due to patient being overweight or obese  Depression Screening and Follow-up Plan: Patient was screened for depression during today's encounter  They screened negative with a PHQ-2 score of 0  Return in about 1 year (around 1/13/2024)  Chief Complaint:     Chief Complaint   Patient presents with   • Physical Exam   • Follow-up     Chronic conditions      History of Present Illness:     Adult Annual Physical   Patient here for a comprehensive physical exam  The patient reports Patient with history of hypertension and blood pressure well controlled she denies any chest pain shortness of palpitation no dyspnea on exertion no lower extremity edema per patient she has not been taking her medication regularly every day  Diet and Physical Activity  Diet/Nutrition: no special diet  Exercise: no formal exercise  Depression Screening  PHQ-2/9 Depression Screening    Little interest or pleasure in doing things: 0 - not at all  Feeling down, depressed, or hopeless: 0 - not at all  PHQ-2 Score: 0  PHQ-2 Interpretation: Negative depression screen       General Health  Sleep: patient with HX of obstructive sleep apnea  Hearing: normal - bilateral   Vision: no vision problems  Dental: regular dental visits         /GYN Health  F/up with GYN   Review of Systems:     Review of Systems Constitutional: Negative for chills and fever  HENT: Negative for congestion, ear pain and sore throat  Eyes: Negative for pain and visual disturbance  Respiratory: Negative for cough and shortness of breath  Cardiovascular: Negative for chest pain and palpitations  Gastrointestinal: Negative for abdominal pain, constipation, diarrhea and vomiting  Genitourinary: Negative for dysuria and hematuria  Musculoskeletal: Negative for arthralgias and back pain  Skin: Negative for color change and rash  Neurological: Negative for seizures and syncope  Hematological: Does not bruise/bleed easily  Psychiatric/Behavioral: Negative for agitation  All other systems reviewed and are negative       Past Medical History:     Past Medical History:   Diagnosis Date   • Abdominal pain     "sharp at times"   • Heavy menses    • Hypertension     not on meds   • Irregular menses    • Migraines    • Moderate exercise     'stands and walks alot at work"   • Obesity    • Teeth missing       Past Surgical History:     Past Surgical History:   Procedure Laterality Date   •  SECTION     • HERNIA REPAIR      "age 5 approx"   • POLYPECTOMY N/A 2020    Procedure: POLYPECTOMY;  Surgeon: Christophe Garcia MD;  Location: [facility];  Service: Gynecology   • ND HYSTEROSCOPY BX ENDOMETRIUM&/POLYPC W/WO D&C N/A 2020    Procedure: DILATATION AND CURETTAGE (D&C) WITH HYSTEROSCOPY;  Surgeon: Christophe Garcia MD;  Location: [facility];  Service: Gynecology      Social History:     Social History     Socioeconomic History   • Marital status: /Civil Union     Spouse name: None   • Number of children: None   • Years of education: None   • Highest education level: None   Occupational History   • None   Tobacco Use   • Smoking status: Never   • Smokeless tobacco: Never   • Tobacco comments:     pt somkes hooka    Vaping Use   • Vaping Use: Some days   • Substances: Nicotine, Flavoring   Substance and Sexual Activity • Alcohol use: Yes     Comment: social    • Drug use: Never   • Sexual activity: Not Currently   Other Topics Concern   • None   Social History Narrative   • None     Social Determinants of Health     Financial Resource Strain: Not on file   Food Insecurity: Not on file   Transportation Needs: Not on file   Physical Activity: Not on file   Stress: Not on file   Social Connections: Not on file   Intimate Partner Violence: Not on file   Housing Stability: Not on file      Family History:     Family History   Problem Relation Age of Onset   • Hypertension Mother    • No Known Problems Father    • No Known Problems Sister    • No Known Problems Brother    • No Known Problems Daughter    • No Known Problems Maternal Grandmother    • No Known Problems Maternal Grandfather    • No Known Problems Paternal Grandmother    • No Known Problems Sister       Current Medications:     Current Outpatient Medications   Medication Sig Dispense Refill   • losartan-hydrochlorothiazide (HYZAAR) 100-25 MG per tablet Take 1 tablet by mouth daily 90 tablet 3   • NIFEdipine (PROCARDIA XL) 30 mg 24 hr tablet TAKE 1 TABLET BY MOUTH EVERY DAY 90 tablet 0     No current facility-administered medications for this visit  Allergies: Allergies   Allergen Reactions   • Avocado - Food Allergy Throat Swelling   • Latex Itching and Swelling     "with condoms"   • Eggs Or Egg-Derived Products - Food Allergy Itching     Eggs if bought at a fast food place/regular hard boiled can eat at home   • Watermelon [Citrullus Vulgaris] Itching     Of throat      Physical Exam:     /90 (BP Location: Left arm, Patient Position: Sitting)   Pulse 71   Temp 99 2 °F (37 3 °C) (Tympanic)   Ht 5' 3" (1 6 m)   Wt 119 kg (262 lb)   LMP 12/12/2022 (Exact Date)   SpO2 99%   Breastfeeding No   BMI 46 41 kg/m²     Physical Exam  Vitals and nursing note reviewed  Constitutional:       General: She is not in acute distress       Appearance: She is well-developed  HENT:      Head: Normocephalic and atraumatic  Right Ear: Tympanic membrane normal       Left Ear: Tympanic membrane normal       Nose: No congestion  Mouth/Throat:      Pharynx: No oropharyngeal exudate  Eyes:      Conjunctiva/sclera: Conjunctivae normal    Cardiovascular:      Rate and Rhythm: Normal rate and regular rhythm  Heart sounds: No murmur heard  Pulmonary:      Effort: Pulmonary effort is normal  No respiratory distress  Breath sounds: Normal breath sounds  Abdominal:      Palpations: Abdomen is soft  Tenderness: There is no abdominal tenderness  Musculoskeletal:         General: No swelling  Cervical back: Neck supple  Skin:     General: Skin is warm and dry  Capillary Refill: Capillary refill takes less than 2 seconds  Findings: No erythema or rash  Neurological:      Mental Status: She is alert and oriented to person, place, and time     Psychiatric:         Mood and Affect: Mood normal           Vee Magallon MD   [address] [place] [address]

## 2023-01-13 NOTE — ASSESSMENT & PLAN NOTE
Chronic and controlled with BMI 46 41 I discussed with patient and bilateral action and she declined need for today as she will think about it discussed portion control low-carb low-fat diet and increase activity

## 2023-02-14 ENCOUNTER — OFFICE VISIT (OUTPATIENT)
Dept: FAMILY MEDICINE CLINIC | Facility: CLINIC | Age: 33
End: 2023-02-14

## 2023-02-14 VITALS
OXYGEN SATURATION: 97 % | HEIGHT: 63 IN | HEART RATE: 61 BPM | DIASTOLIC BLOOD PRESSURE: 100 MMHG | WEIGHT: 257 LBS | TEMPERATURE: 99.2 F | BODY MASS INDEX: 45.54 KG/M2 | SYSTOLIC BLOOD PRESSURE: 130 MMHG

## 2023-02-14 DIAGNOSIS — I10 UNCONTROLLED HYPERTENSION: Primary | ICD-10-CM

## 2023-02-14 RX ORDER — NIFEDIPINE 60 MG/1
60 TABLET, EXTENDED RELEASE ORAL DAILY
Qty: 30 TABLET | Refills: 1 | Status: SHIPPED | OUTPATIENT
Start: 2023-02-14

## 2023-02-14 NOTE — PROGRESS NOTES
Name: Toney Smiley      : 1990      MRN: [id number]  Encounter Provider: Harvey Snider MD  Encounter Date: 2023   Encounter department: [place] [address]  Uncontrolled hypertension  Assessment & Plan:  Chronic asymptomatic uncontrolled will increase the nifedipine to 60 mg once a day continue losartan -25 mg once a day low-salt diet important lose weight discussed with patient proper use medication possible side effects reviewed    Orders:  -     NIFEdipine (PROCARDIA XL) 60 mg 24 hr tablet; Take 1 tablet (60 mg total) by mouth daily           Subjective      Patient here follow-up of the blood pressure patient history of hypertension she is currently amlodipine 5 mg and losartan -25 mg once a day she been compliant with the medication tolerated well without side effect she been having hard time to lose weight and      Review of Systems   Constitutional: Negative for chills and fever  HENT: Negative for ear pain and sore throat  Eyes: Negative for pain and visual disturbance  Respiratory: Negative for cough and shortness of breath  Cardiovascular: Negative for chest pain and palpitations  Gastrointestinal: Negative for abdominal pain, blood in stool, constipation, diarrhea and vomiting  Genitourinary: Negative for dysuria and hematuria  Skin: Negative for color change and rash  Neurological: Negative for seizures and syncope  All other systems reviewed and are negative        Current Outpatient Medications on File Prior to Visit   Medication Sig   • losartan-hydrochlorothiazide (HYZAAR) 100-25 MG per tablet Take 1 tablet by mouth daily   • [DISCONTINUED] NIFEdipine (PROCARDIA XL) 30 mg 24 hr tablet TAKE 1 TABLET BY MOUTH EVERY DAY       Objective     /100 (BP Location: Left arm, Patient Position: Sitting)   Pulse 61   Temp 99 2 °F (37 3 °C) (Tympanic)   Ht 5' 2 5" (1 588 m)   Wt 117 kg (257 lb) LMP 02/12/2023 (Exact Date)   SpO2 97%   Breastfeeding No   BMI 46 26 kg/m²     Physical Exam  Vitals reviewed  Constitutional:       General: She is not in acute distress  Appearance: She is well-developed  She is not diaphoretic  HENT:      Head: Normocephalic  Right Ear: External ear normal       Left Ear: External ear normal    Eyes:      General:         Right eye: No discharge  Left eye: No discharge  Conjunctiva/sclera: Conjunctivae normal    Neck:      Vascular: No JVD  Cardiovascular:      Rate and Rhythm: Normal rate and regular rhythm  Heart sounds: Normal heart sounds  No murmur heard  No gallop  Pulmonary:      Effort: Pulmonary effort is normal  No respiratory distress  Breath sounds: Normal breath sounds  No stridor  No wheezing or rales  Chest:      Chest wall: No tenderness  Abdominal:      General: There is no distension  Palpations: Abdomen is soft  There is no mass  Tenderness: There is no abdominal tenderness  There is no rebound  Musculoskeletal:         General: No tenderness  Cervical back: Normal range of motion and neck supple  Lymphadenopathy:      Cervical: No cervical adenopathy  Skin:     General: Skin is warm  Findings: No erythema or rash  Neurological:      Mental Status: She is alert and oriented to person, place, and time         Jo Mosher MD

## 2023-02-15 NOTE — ASSESSMENT & PLAN NOTE
Chronic asymptomatic uncontrolled will increase the nifedipine to 60 mg once a day continue losartan -25 mg once a day low-salt diet important lose weight discussed with patient proper use medication possible side effects reviewed

## 2024-01-15 ENCOUNTER — OFFICE VISIT (OUTPATIENT)
Dept: FAMILY MEDICINE CLINIC | Facility: CLINIC | Age: 34
End: 2024-01-15
Payer: COMMERCIAL

## 2024-01-15 VITALS
HEART RATE: 63 BPM | BODY MASS INDEX: 47.31 KG/M2 | OXYGEN SATURATION: 97 % | SYSTOLIC BLOOD PRESSURE: 130 MMHG | DIASTOLIC BLOOD PRESSURE: 80 MMHG | WEIGHT: 267 LBS | HEIGHT: 63 IN | TEMPERATURE: 97.2 F

## 2024-01-15 DIAGNOSIS — E66.01 MORBID OBESITY WITH BMI OF 45.0-49.9, ADULT (HCC): ICD-10-CM

## 2024-01-15 DIAGNOSIS — Z13.29 SCREENING FOR THYROID DISORDER: ICD-10-CM

## 2024-01-15 DIAGNOSIS — Z13.220 SCREENING FOR LIPID DISORDERS: ICD-10-CM

## 2024-01-15 DIAGNOSIS — R06.83 SNORING: ICD-10-CM

## 2024-01-15 DIAGNOSIS — F17.290 HOOKAH PIPE SMOKER: ICD-10-CM

## 2024-01-15 DIAGNOSIS — Z12.4 SCREENING FOR CERVICAL CANCER: ICD-10-CM

## 2024-01-15 DIAGNOSIS — R01.1 HEART MURMUR: ICD-10-CM

## 2024-01-15 DIAGNOSIS — Z28.21 IMMUNIZATION REFUSED: ICD-10-CM

## 2024-01-15 DIAGNOSIS — Z00.01 ENCOUNTER FOR WELL ADULT EXAM WITH ABNORMAL FINDINGS: Primary | ICD-10-CM

## 2024-01-15 DIAGNOSIS — I10 UNCONTROLLED HYPERTENSION: ICD-10-CM

## 2024-01-15 PROCEDURE — 99214 OFFICE O/P EST MOD 30 MIN: CPT | Performed by: FAMILY MEDICINE

## 2024-01-15 PROCEDURE — 99395 PREV VISIT EST AGE 18-39: CPT | Performed by: FAMILY MEDICINE

## 2024-01-15 NOTE — ASSESSMENT & PLAN NOTE
Chronic uncontrolled increase in BMI compared with before her BMI today 48.0 patient try different diet is not successful discussed bariatric options she is interested we will refer patient to see the bariatric surgeon

## 2024-01-15 NOTE — ASSESSMENT & PLAN NOTE
Symptomatic discussed with the patient complication office knowing she is at the risk for obstructive sleep apnea recommend to be evaluated by sleep specialist and having sleep study she declined at discussed important lose weight and proper sleep position

## 2024-01-15 NOTE — ASSESSMENT & PLAN NOTE
Systolic heart murmur patient already had echocardiogram in 2022 results reviewed with the patient she is asymptomatic

## 2024-01-15 NOTE — ASSESSMENT & PLAN NOTE
Chronic asymptomatic currently on nifedipine 60 mg losartan /25 mg she tolerated well compliant with the medication at the time of the arrival blood pressure 120/90 I rechecked with the pressure with correct size of the cuff and the blood pressure 120/80 continue current medication she is due for blood work including CBC CMP lipids UA ordered today

## 2024-01-15 NOTE — PROGRESS NOTES
ADULT ANNUAL PHYSICAL  [facility] PRIMARY CARE [facility]    NAME: Jaelny Lyon  AGE: 33 y.o. SEX: female  : 1990     DATE: 1/15/2024     Assessment and Plan:     Problem List Items Addressed This Visit     Morbid obesity with BMI of 45.0-49.9, adult (HCC)     Chronic uncontrolled increase in BMI compared with before her BMI today 48.0 patient try different diet is not successful discussed bariatric options she is interested we will refer patient to see the bariatric surgeon         Relevant Orders    Ambulatory Referral to Bariatric Surgery    CBC and differential    Comprehensive metabolic panel    Encounter for well adult exam with abnormal findings - Primary     Advice and education were given regarding nutrition, aerobic exercises, weight-bearing exercises, cardiovascular risk reduction, fall risk reduction, and age-appropriate supplements.     The patient was counseled regarding instructions for management, risk factor reductions, prognosis, risks and benefits of treatment options, patient and family education, and importance of compliance with treatment.         Relevant Orders    CBC and differential    Comprehensive metabolic panel    Uncontrolled hypertension     Chronic asymptomatic currently on nifedipine 60 mg losartan /25 mg she tolerated well compliant with the medication at the time of the arrival blood pressure 120/90 I rechecked with the pressure with correct size of the cuff and the blood pressure 120/80 continue current medication she is due for blood work including CBC CMP lipids UA ordered today         Relevant Orders    CBC and differential    Comprehensive metabolic panel    Urinalysis with microscopic    Snoring     Symptomatic discussed with the patient complication office knowing she is at the risk for obstructive sleep apnea recommend to be evaluated by sleep specialist and having sleep study she declined at discussed  important lose weight and proper sleep position         Relevant Orders    CBC and differential    Comprehensive metabolic panel    Heart murmur     Systolic heart murmur patient already had echocardiogram in 2022 results reviewed with the patient she is asymptomatic         Relevant Orders    CBC and differential    Comprehensive metabolic panel    Immunization refused     Patient refused recommended immunization for today         Relevant Orders    CBC and differential    Comprehensive metabolic panel    Hookah pipe smoker     Patient smoked hookah once a while well of the complication of smoking         Relevant Orders    CBC and differential    Comprehensive metabolic panel    Screening for cervical cancer     Due for Pap smear screening for cervical cancer will follow patient to see GYN         Relevant Orders    Ambulatory Referral to Gynecology    CBC and differential    Comprehensive metabolic panel   Other Visit Diagnoses     Screening for lipid disorders        Relevant Orders    Lipid Panel with Direct LDL reflex    Screening for thyroid disorder        Relevant Orders    TSH, 3rd generation with Free T4 reflex          Immunizations and preventive care screenings were discussed with patient today. Appropriate education was printed on patient's after visit summary.    Counseling:  Alcohol/drug use: discussed moderation in alcohol intake, the recommendations for healthy alcohol use, and avoidance of illicit drug use.  Dental Health: discussed importance of regular tooth brushing, flossing, and dental visits.  Injury prevention: discussed safety/seat belts, safety helmets, smoke detectors, carbon dioxide detectors, and smoking near bedding or upholstery.  Sexual health: discussed sexually transmitted diseases, partner selection, use of condoms, avoidance of unintended pregnancy, and contraceptive alternatives.  Exercise: the importance of regular exercise/physical activity was discussed. Recommend exercise  3-5 times per week for at least 30 minutes.       Depression Screening and Follow-up Plan: Patient was screened for depression during today's encounter. They screened negative with a PHQ-2 score of 0.    Tobacco Cessation Counseling: Tobacco cessation counseling was provided. The patient is sincerely urged to quit consumption of tobacco. She is not ready to quit tobacco.         Return in about 1 year (around 1/15/2025).     Chief Complaint:     Chief Complaint   Patient presents with   • Physical Exam      History of Present Illness:     Adult Annual Physical   Patient here for a comprehensive physical exam. The patient reports problems - patient concerned about her weight she been having hard time to lose weight despite watching what she eats she gained weight compared with before also patient concerned about snoring does not matter what sleep position no recent blood work .    Diet and Physical Activity  Diet/Nutrition: low carb diet.   Exercise: no formal exercise.      Depression Screening  PHQ-2/9 Depression Screening    Little interest or pleasure in doing things: 0 - not at all  Feeling down, depressed, or hopeless: 0 - not at all  PHQ-2 Score: 0  PHQ-2 Interpretation: Negative depression screen       General Health  Sleep: snores loudly.   Hearing: normal - bilateral.  Vision: no vision problems.   Dental: regular dental visits.       /GYN Health  Follows with gynecology? no   Last menstrual period: 01/02/24  Contraceptive method:  none .  History of STDs?: no.     Advanced Care Planning  Do you have an advanced directive? no  Do you have a durable medical power of ? no     Review of Systems:     Review of Systems   Constitutional:  Negative for chills and fever.        Snoring   HENT:  Negative for ear pain, sinus pressure, sinus pain and sore throat.    Eyes:  Negative for pain and visual disturbance.   Respiratory:  Negative for cough and shortness of breath.    Cardiovascular:  Negative for  "chest pain and palpitations.   Gastrointestinal:  Negative for abdominal pain, constipation, diarrhea and vomiting.   Genitourinary:  Negative for dysuria and hematuria.   Musculoskeletal:  Negative for arthralgias and back pain.   Skin:  Negative for color change and rash.   Neurological:  Negative for seizures and syncope.   Hematological:  Does not bruise/bleed easily.   Psychiatric/Behavioral:  Negative for behavioral problems.    All other systems reviewed and are negative.     Past Medical History:     Past Medical History:   Diagnosis Date   • Abdominal pain     \"sharp at times\"   • Heavy menses    • Hypertension     not on meds   • Irregular menses    • Migraines    • Moderate exercise     'stands and walks alot at work\"   • Obesity    • Teeth missing       Past Surgical History:     Past Surgical History:   Procedure Laterality Date   •  SECTION     • HERNIA REPAIR      \"age 7 approx\"   • POLYPECTOMY N/A 2020    Procedure: POLYPECTOMY;  Surgeon: Mirna Riggins MD;  Location: [facility] OR;  Service: Gynecology   • AR HYSTEROSCOPY BX ENDOMETRIUM&/POLYPC W/WO D&C N/A 2020    Procedure: DILATATION AND CURETTAGE (D&C) WITH HYSTEROSCOPY;  Surgeon: Mirna Riggins MD;  Location: [facility];  Service: Gynecology      Social History:     Social History     Socioeconomic History   • Marital status: /Civil Union     Spouse name: None   • Number of children: None   • Years of education: None   • Highest education level: None   Occupational History   • None   Tobacco Use   • Smoking status: Never     Passive exposure: Never   • Smokeless tobacco: Never   • Tobacco comments:     pt somkes hooka    Vaping Use   • Vaping status: Some Days   • Start date: 2020   • Substances: Nicotine, Flavoring   Substance and Sexual Activity   • Alcohol use: Yes     Comment: social    • Drug use: Never   • Sexual activity: Not Currently   Other Topics Concern   • None   Social History Narrative   • None     Social " Determinants of Health     Financial Resource Strain: Low Risk  (1/12/2022)    Overall Financial Resource Strain (CARDIA)    • Difficulty of Paying Living Expenses: Not hard at all   Food Insecurity: No Food Insecurity (1/12/2022)    Hunger Vital Sign    • Worried About Running Out of Food in the Last Year: Never true    • Ran Out of Food in the Last Year: Never true   Transportation Needs: No Transportation Needs (1/12/2022)    PRAPARE - Transportation    • Lack of Transportation (Medical): No    • Lack of Transportation (Non-Medical): No   Physical Activity: Inactive (1/12/2022)    Exercise Vital Sign    • Days of Exercise per Week: 0 days    • Minutes of Exercise per Session: 0 min   Stress: No Stress Concern Present (1/12/2022)    [place] [employer] - Occupational Stress Questionnaire    • Feeling of Stress : Not at all   Social Connections: Moderately Isolated (1/12/2022)    Social Connection and Isolation Panel [NHANES]    • Frequency of Communication with Friends and Family: More than three times a week    • Frequency of Social Gatherings with Friends and Family: Twice a week    • Attends Temple Services: 1 to 4 times per year    • Active Member of Clubs or Organizations: No    • Attends Club or Organization Meetings: Never    • Marital Status: Never    Intimate Partner Violence: Not At Risk (1/12/2022)    Humiliation, Afraid, Rape, and Kick questionnaire    • Fear of Current or Ex-Partner: No    • Emotionally Abused: No    • Physically Abused: No    • Sexually Abused: No   Housing Stability: Unknown (1/12/2022)    Housing Stability Vital Sign    • Unable to Pay for Housing in the Last Year: No    • Number of Places Lived in the Last Year: Not on file    • Unstable Housing in the Last Year: No      Family History:     Family History   Problem Relation Age of Onset   • Hypertension Mother    • No Known Problems Father    • No Known Problems Sister    • No Known Problems Brother   "  • No Known Problems Daughter    • No Known Problems Maternal Grandmother    • No Known Problems Maternal Grandfather    • No Known Problems Paternal Grandmother    • No Known Problems Sister       Current Medications:     Current Outpatient Medications   Medication Sig Dispense Refill   • losartan-hydrochlorothiazide (HYZAAR) 100-25 MG per tablet Take 1 tablet by mouth daily 90 tablet 3   • NIFEdipine (PROCARDIA XL) 60 mg 24 hr tablet TAKE 1 TABLET BY MOUTH EVERY DAY 90 tablet 0     No current facility-administered medications for this visit.      Allergies:     Allergies   Allergen Reactions   • Avocado - Food Allergy Throat Swelling   • Latex Itching and Swelling     \"with condoms\"   • Eggs Or Egg-Derived Products - Food Allergy Itching     Eggs if bought at a fast food place/regular hard boiled can eat at home   • Watermelon [Citrullus Vulgaris] Itching     Of throat      Physical Exam:     /80   Pulse 63   Temp (!) 97.2 °F (36.2 °C) (Tympanic)   Ht 5' 2.5\" (1.588 m)   Wt 121 kg (267 lb)   SpO2 97%   BMI 48.06 kg/m²     Physical Exam  Vitals and nursing note reviewed.   Constitutional:       General: She is not in acute distress.     Appearance: She is well-developed.   HENT:      Head: Normocephalic and atraumatic.      Right Ear: Tympanic membrane normal. There is no impacted cerumen.      Left Ear: Tympanic membrane normal. There is no impacted cerumen.      Nose: No congestion or rhinorrhea.      Mouth/Throat:      Pharynx: No oropharyngeal exudate or posterior oropharyngeal erythema.   Eyes:      General:         Right eye: No discharge.         Left eye: No discharge.      Conjunctiva/sclera: Conjunctivae normal.   Cardiovascular:      Rate and Rhythm: Normal rate and regular rhythm.      Heart sounds: No murmur heard.  Pulmonary:      Effort: Pulmonary effort is normal. No respiratory distress.      Breath sounds: Normal breath sounds.   Abdominal:      Palpations: Abdomen is soft.      " Tenderness: There is no abdominal tenderness.   Musculoskeletal:         General: No swelling.      Cervical back: Neck supple.   Skin:     General: Skin is warm and dry.      Capillary Refill: Capillary refill takes less than 2 seconds.      Findings: No rash.   Neurological:      Mental Status: She is alert and oriented to person, place, and time.   Psychiatric:         Mood and Affect: Mood normal.          Александр Moreno MD   [place] [facility]IMARY [facility]

## 2024-02-21 PROBLEM — Z12.4 SCREENING FOR CERVICAL CANCER: Status: RESOLVED | Noted: 2024-01-15 | Resolved: 2024-02-21

## 2024-03-06 ENCOUNTER — APPOINTMENT (OUTPATIENT)
Dept: LAB | Facility: HOSPITAL | Age: 34
End: 2024-03-06
Payer: COMMERCIAL

## 2024-03-06 DIAGNOSIS — Z12.4 SCREENING FOR CERVICAL CANCER: ICD-10-CM

## 2024-03-06 DIAGNOSIS — R01.1 HEART MURMUR: ICD-10-CM

## 2024-03-06 DIAGNOSIS — Z13.220 SCREENING FOR LIPID DISORDERS: ICD-10-CM

## 2024-03-06 DIAGNOSIS — Z00.01 ENCOUNTER FOR WELL ADULT EXAM WITH ABNORMAL FINDINGS: ICD-10-CM

## 2024-03-06 DIAGNOSIS — R06.83 SNORING: ICD-10-CM

## 2024-03-06 DIAGNOSIS — F17.290 HOOKAH PIPE SMOKER: ICD-10-CM

## 2024-03-06 DIAGNOSIS — Z13.29 SCREENING FOR THYROID DISORDER: ICD-10-CM

## 2024-03-06 DIAGNOSIS — I10 UNCONTROLLED HYPERTENSION: ICD-10-CM

## 2024-03-06 DIAGNOSIS — Z28.21 IMMUNIZATION REFUSED: ICD-10-CM

## 2024-03-06 DIAGNOSIS — E66.01 MORBID OBESITY WITH BMI OF 45.0-49.9, ADULT (HCC): ICD-10-CM

## 2024-03-06 LAB
ALBUMIN SERPL BCP-MCNC: 3.8 G/DL (ref 3.5–5)
ALP SERPL-CCNC: 58 U/L (ref 34–104)
ALT SERPL W P-5'-P-CCNC: 16 U/L (ref 7–52)
ANION GAP SERPL CALCULATED.3IONS-SCNC: 5 MMOL/L
AST SERPL W P-5'-P-CCNC: 14 U/L (ref 13–39)
BACTERIA UR QL AUTO: NORMAL /HPF
BASOPHILS # BLD AUTO: 0.03 THOUSANDS/ÂΜL (ref 0–0.1)
BASOPHILS NFR BLD AUTO: 1 % (ref 0–1)
BILIRUB SERPL-MCNC: 0.36 MG/DL (ref 0.2–1)
BILIRUB UR QL STRIP: NEGATIVE
BUN SERPL-MCNC: 12 MG/DL (ref 5–25)
CALCIUM SERPL-MCNC: 8.9 MG/DL (ref 8.4–10.2)
CHLORIDE SERPL-SCNC: 106 MMOL/L (ref 96–108)
CHOLEST SERPL-MCNC: 183 MG/DL
CLARITY UR: CLEAR
CO2 SERPL-SCNC: 27 MMOL/L (ref 21–32)
COLOR UR: NORMAL
CREAT SERPL-MCNC: 0.73 MG/DL (ref 0.6–1.3)
EOSINOPHIL # BLD AUTO: 0.11 THOUSAND/ÂΜL (ref 0–0.61)
EOSINOPHIL NFR BLD AUTO: 2 % (ref 0–6)
ERYTHROCYTE [DISTWIDTH] IN BLOOD BY AUTOMATED COUNT: 13.4 % (ref 11.6–15.1)
GFR SERPL CREATININE-BSD FRML MDRD: 107 ML/MIN/1.73SQ M
GLUCOSE P FAST SERPL-MCNC: 82 MG/DL (ref 65–99)
GLUCOSE UR STRIP-MCNC: NEGATIVE MG/DL
HCT VFR BLD AUTO: 39.4 % (ref 34.8–46.1)
HDLC SERPL-MCNC: 51 MG/DL
HGB BLD-MCNC: 12.4 G/DL (ref 11.5–15.4)
HGB UR QL STRIP.AUTO: NEGATIVE
IMM GRANULOCYTES # BLD AUTO: 0.02 THOUSAND/UL (ref 0–0.2)
IMM GRANULOCYTES NFR BLD AUTO: 0 % (ref 0–2)
KETONES UR STRIP-MCNC: NEGATIVE MG/DL
LDLC SERPL CALC-MCNC: 111 MG/DL (ref 0–100)
LEUKOCYTE ESTERASE UR QL STRIP: NEGATIVE
LYMPHOCYTES # BLD AUTO: 1.78 THOUSANDS/ÂΜL (ref 0.6–4.47)
LYMPHOCYTES NFR BLD AUTO: 28 % (ref 14–44)
MCH RBC QN AUTO: 27.9 PG (ref 26.8–34.3)
MCHC RBC AUTO-ENTMCNC: 31.5 G/DL (ref 31.4–37.4)
MCV RBC AUTO: 89 FL (ref 82–98)
MONOCYTES # BLD AUTO: 0.38 THOUSAND/ÂΜL (ref 0.17–1.22)
MONOCYTES NFR BLD AUTO: 6 % (ref 4–12)
NEUTROPHILS # BLD AUTO: 4.04 THOUSANDS/ÂΜL (ref 1.85–7.62)
NEUTS SEG NFR BLD AUTO: 63 % (ref 43–75)
NITRITE UR QL STRIP: NEGATIVE
NON-SQ EPI CELLS URNS QL MICRO: NORMAL /HPF
NRBC BLD AUTO-RTO: 0 /100 WBCS
PH UR STRIP.AUTO: 7.5 [PH]
PLATELET # BLD AUTO: 291 THOUSANDS/UL (ref 149–390)
PMV BLD AUTO: 10.6 FL (ref 8.9–12.7)
POTASSIUM SERPL-SCNC: 4 MMOL/L (ref 3.5–5.3)
PROT SERPL-MCNC: 6.7 G/DL (ref 6.4–8.4)
PROT UR STRIP-MCNC: NEGATIVE MG/DL
RBC # BLD AUTO: 4.44 MILLION/UL (ref 3.81–5.12)
RBC #/AREA URNS AUTO: NORMAL /HPF
SODIUM SERPL-SCNC: 138 MMOL/L (ref 135–147)
SP GR UR STRIP.AUTO: 1.02 (ref 1–1.03)
TRIGL SERPL-MCNC: 103 MG/DL
TSH SERPL DL<=0.05 MIU/L-ACNC: 1.05 UIU/ML (ref 0.45–4.5)
UROBILINOGEN UR STRIP-ACNC: <2 MG/DL
WBC # BLD AUTO: 6.36 THOUSAND/UL (ref 4.31–10.16)
WBC #/AREA URNS AUTO: NORMAL /HPF

## 2024-03-06 PROCEDURE — 80061 LIPID PANEL: CPT

## 2024-03-06 PROCEDURE — 80053 COMPREHEN METABOLIC PANEL: CPT

## 2024-03-06 PROCEDURE — 85025 COMPLETE CBC W/AUTO DIFF WBC: CPT

## 2024-03-06 PROCEDURE — 36415 COLL VENOUS BLD VENIPUNCTURE: CPT

## 2024-03-06 PROCEDURE — 84443 ASSAY THYROID STIM HORMONE: CPT

## 2024-03-20 ENCOUNTER — OFFICE VISIT (OUTPATIENT)
Dept: FAMILY MEDICINE CLINIC | Facility: CLINIC | Age: 34
End: 2024-03-20
Payer: COMMERCIAL

## 2024-03-20 VITALS
TEMPERATURE: 97.7 F | HEART RATE: 84 BPM | HEIGHT: 63 IN | OXYGEN SATURATION: 95 % | WEIGHT: 268 LBS | BODY MASS INDEX: 47.48 KG/M2 | DIASTOLIC BLOOD PRESSURE: 80 MMHG | SYSTOLIC BLOOD PRESSURE: 150 MMHG

## 2024-03-20 DIAGNOSIS — K59.09 OTHER CONSTIPATION: ICD-10-CM

## 2024-03-20 DIAGNOSIS — I10 UNCONTROLLED HYPERTENSION: Primary | ICD-10-CM

## 2024-03-20 DIAGNOSIS — E78.2 MIXED HYPERLIPIDEMIA: ICD-10-CM

## 2024-03-20 PROCEDURE — 99214 OFFICE O/P EST MOD 30 MIN: CPT | Performed by: FAMILY MEDICINE

## 2024-03-20 NOTE — ASSESSMENT & PLAN NOTE
Chronic uncontrolled patient compliant with her medication she is only receiving 60 mg and the losartan/HCTZ 100/25 mg once a day but per patient she is having increased stress and worry about her job she is asymptomatic discussed change medication she declined for today recommended low-salt diet important lose weight continue monitor blood pressure will reevaluate in 1 months

## 2024-03-20 NOTE — PROGRESS NOTES
Name: Jaelyn Lyon      : 1990      MRN: [id number]  Encounter Provider: Александр Moreno MD  Encounter Date: 3/20/2024   Encounter department: [facility]    Assessment & Plan     1. Uncontrolled hypertension  Assessment & Plan:  Chronic uncontrolled patient compliant with her medication she is only receiving 60 mg and the losartan/HCTZ 100/25 mg once a day but per patient she is having increased stress and worry about her job she is asymptomatic discussed change medication she declined for today recommended low-salt diet important lose weight continue monitor blood pressure will reevaluate in 1 months      2. Mixed hyperlipidemia  Assessment & Plan:  Chronic asymptomatic fair control encourage patient to continue with a low-fat diet      3. Other constipation  Assessment & Plan:  Chronic asymptomatic fair control manage conservatively increase fiber increase fluid intake           Subjective      Patient here follow-up with a chronic condition she compliant with the medication tolerated well without side effect looking at the vital signs blood pressure uncontrolled patient denies chest patient breast no palpitation she been going through stress worry about her job and affecting her sleep recent blood work discussed with the patient      Review of Systems   Constitutional:  Negative for chills and fever.   HENT:  Negative for ear pain and sore throat.    Eyes:  Negative for pain and visual disturbance.   Respiratory:  Negative for cough and shortness of breath.    Cardiovascular:  Negative for chest pain and palpitations.   Gastrointestinal:  Negative for abdominal pain, constipation, diarrhea and vomiting.   Genitourinary:  Negative for dysuria and hematuria.   Musculoskeletal:  Negative for arthralgias and back pain.   Skin:  Negative for color change and rash.   Neurological:  Negative for seizures and syncope.   All other systems reviewed and are  "negative.      Current Outpatient Medications on File Prior to Visit   Medication Sig   • losartan-hydrochlorothiazide (HYZAAR) 100-25 MG per tablet Take 1 tablet by mouth daily   • NIFEdipine (PROCARDIA XL) 60 mg 24 hr tablet TAKE 1 TABLET BY MOUTH EVERY DAY       Objective     /80 (BP Location: Left arm, Patient Position: Sitting)   Pulse 84   Temp 97.7 °F (36.5 °C) (Tympanic)   Ht 5' 3.25\" (1.607 m)   Wt 122 kg (268 lb)   LMP 03/20/2024 (Exact Date)   SpO2 95%   Breastfeeding No   BMI 47.10 kg/m²     Physical Exam  Vitals and nursing note reviewed.   Constitutional:       General: She is not in acute distress.     Appearance: She is not diaphoretic.   HENT:      Head: Normocephalic and atraumatic.      Right Ear: Tympanic membrane normal. There is no impacted cerumen.      Left Ear: Tympanic membrane normal. There is no impacted cerumen.      Nose: Nose normal. No congestion or rhinorrhea.      Mouth/Throat:      Pharynx: No posterior oropharyngeal erythema.   Eyes:      General: No scleral icterus.        Right eye: No discharge.         Left eye: No discharge.   Cardiovascular:      Rate and Rhythm: Normal rate and regular rhythm.      Heart sounds: No murmur heard.  Pulmonary:      Effort: Pulmonary effort is normal. No respiratory distress.   Abdominal:      General: There is no distension.      Tenderness: There is no abdominal tenderness.   Musculoskeletal:         General: Normal range of motion.      Cervical back: Normal range of motion.      Right lower leg: No edema.      Left lower leg: No edema.   Skin:     Findings: No rash.   Neurological:      Mental Status: She is alert and oriented to person, place, and time.       Александр Moreno MD    "

## 2024-03-26 ENCOUNTER — CLINICAL SUPPORT (OUTPATIENT)
Dept: BARIATRICS | Facility: CLINIC | Age: 34
End: 2024-03-26

## 2024-03-26 VITALS
HEIGHT: 63 IN | WEIGHT: 267 LBS | SYSTOLIC BLOOD PRESSURE: 146 MMHG | BODY MASS INDEX: 47.31 KG/M2 | HEART RATE: 73 BPM | DIASTOLIC BLOOD PRESSURE: 90 MMHG | TEMPERATURE: 98.2 F

## 2024-03-26 DIAGNOSIS — E66.01 MORBID OBESITY WITH BMI OF 45.0-49.9, ADULT (HCC): Primary | ICD-10-CM

## 2024-03-26 PROCEDURE — RECHECK

## 2024-03-26 NOTE — PROGRESS NOTES
Spoke to patient in person:    Patient is interested in the bariatric surgical process. Patient qualifies for surgery with current comorbidities and BMI. Discussed the entire surgical workup process and all requirements of [facility] program and patient's insurance. Answered all questions at the in person visit. All SW/RD questions redirected to the next appointment, the 2-hour evaluation.      Patient verbalized understanding of the surgical process at [facility] and had no further questions at this time.

## 2024-04-02 NOTE — PROGRESS NOTES
Assessment        Diagnoses and all orders for this visit:    Encntr for gyn exam (general) (routine) w/o abn findings    Cervical cancer screening  -     Liquid-based pap, screening    Screening for cervical cancer  -     Ambulatory Referral to Gynecology    Amenorrhea  -     Prolactin; Future  -     hCG, quantitative; Future  -     Follicle stimulating hormone; Future  -     US pelvis complete w transvaginal; Future    Dysmenorrhea  -     US pelvis complete w transvaginal; Future    Screen for STD (sexually transmitted disease)  -     Chlamydia/GC amplified DNA by PCR  -     Trichomonas vaginalis Thin prep  -     RPR-Syphilis Screening (Total Syphilis IGG/IGM); Future  -     HIV 1/2 AG/AB w Reflex [facility] for 2 yr old and above; Future  -     Hepatitis C antibody; Future  -     Hepatitis B surface antigen; Future    Encounter for Depo-Provera contraception  -     medroxyPROGESTERone (DEPO-PROVERA) 150 mg/mL injection; Inject 1 mL (150 mg total) into a muscle every 3 (three) months             Plan      All questions answered.  Await pap smear results.  Contraception: Depo-Provera injections.  Discussed healthy lifestyle modifications.  Educational material distributed.  Follow up in 2 weeks.  Follow up as needed.  Pap smear.  Pelvic ultrasound.     Currently pursuing option for bariatric surgery for weight loss  Discussed with patient this may help with abnormal menses.  Labs and ultrasound were ordered due to irregular periods as well as painful periods.  STD screening was ordered.  She is considering Depo-Provera again.  Discussed Depo-Provera may cause weight gain but states that she did well with this in the past.  Follow-up in 2 weeks for discussion of results and administration of Depo-Provera.      Subjective      Jaelyn Lyon is a 34 y.o. female who presents for annual exam.      Chief Complaint   Patient presents with    New Patient Visit     [facility] care, yearly. Discuss birth control.      She  Sexually  "active  New partner x 1 month    H/o D and C  She states periods were coming every month    She had a late period last month.  She states her last period was extremely painful, lasting longer 9 days    Last Pap: unrecalled  Last mammogram: none  Colorectal cancer screening: none    HPV vaccine completed:no  Current contraception: none  History of abnormal Pap smear: no  History of abnormal mammogram: no  Family history of uterine or ovarian cancer: no  Family history of breast cancer: no  Family history of colon cancer: no    OB History    Para Term  AB Living   2 1 1 0 1 1   SAB IAB Ectopic Multiple Live Births   0 1 0 0 1      # Outcome Date GA Lbr Jacinto/2nd Weight Sex Delivery Anes PTL Lv   2 Term 13 38w0d  2410 g (5 lb 5 oz) F CS-Unspec  N SHERRY   1 IAB                Menstrual History:  OB History          2    Para   1    Term   1       0    AB   1    Living   1         SAB   0    IAB   1    Ectopic   0    Multiple   0    Live Births   1                Menarche age: 12  Patient's last menstrual period was 2024 (exact date).             Past Medical History:   Diagnosis Date    Abdominal pain     \"sharp at times\"    Heavy menses     Hypertension     not on meds    Irregular menses     Migraines     Moderate exercise     'stands and walks alot at work\"    Obesity     Teeth missing      Past Surgical History:   Procedure Laterality Date     SECTION      HERNIA REPAIR      \"age 7 approx\"    POLYPECTOMY N/A 2020    Procedure: POLYPECTOMY;  Surgeon: Mirna Riggins MD;  [facility];  Service: Gynecology    [place] HYSTEROSCOPY BX ENDOMETRIUM&/POLYPC W/WO D&C N/A 2020    Procedure: DILATATION AND CURETTAGE (D&C) WITH HYSTEROSCOPY;  Surgeon: Mirna Riggins MD;  [facility];  Service: Gynecology     Family History   Problem Relation Age of Onset    Hypertension Mother     No Known Problems Father     No Known Problems Sister     No Known Problems Brother  " "   No Known Problems Daughter     No Known Problems Maternal Grandmother     No Known Problems Maternal Grandfather     No Known Problems Paternal Grandmother     No Known Problems Sister        Social History     Tobacco Use    Smoking status: Never     Passive exposure: Never    Smokeless tobacco: Never    Tobacco comments:     pt micah hooka    Vaping Use    Vaping status: Some Days    Start date: 1/1/2020    Substances: Nicotine, Flavoring   Substance Use Topics    Alcohol use: Yes     Comment: social     Drug use: Never          Current Outpatient Medications:     losartan-hydrochlorothiazide (HYZAAR) 100-25 MG per tablet, Take 1 tablet by mouth daily, Disp: 90 tablet, Rfl: 3    medroxyPROGESTERone (DEPO-PROVERA) 150 mg/mL injection, Inject 1 mL (150 mg total) into a muscle every 3 (three) months, Disp: 1 mL, Rfl: 3    NIFEdipine (PROCARDIA XL) 60 mg 24 hr tablet, TAKE 1 TABLET BY MOUTH EVERY DAY (Patient not taking: Reported on 3/26/2024), Disp: 90 tablet, Rfl: 0    Allergies   Allergen Reactions    Avocado - Food Allergy Throat Swelling    Latex Itching and Swelling     \"with condoms\"    Eggs Or Egg-Derived Products - Food Allergy Itching     Eggs if bought at a fast food place/regular hard boiled can eat at home    Watermelon [Citrullus Vulgaris] Itching     Of throat           Review of Systems   Constitutional: Negative.    HENT: Negative.     Eyes: Negative.    Respiratory: Negative.     Cardiovascular: Negative.    Gastrointestinal: Negative.    Endocrine: Negative.    Genitourinary:         As noted in HPI   Musculoskeletal: Negative.    Skin: Negative.    Allergic/Immunologic: Negative.    Neurological: Negative.    Hematological: Negative.    Psychiatric/Behavioral: Negative.         /82 (BP Location: Right arm, Patient Position: Sitting, Cuff Size: Large)   Pulse 87   Ht 5' 2.5\" (1.588 m)   Wt 123 kg (271 lb)   LMP 03/20/2024 (Exact Date)   SpO2 98%   BMI 48.78 kg/m²         Physical " Exam  Constitutional:       Appearance: She is well-developed.   Genitourinary:      Vulva, bladder and rectum normal.      No lesions in the vagina.      Genitourinary Comments:         Right Labia: No rash, tenderness, lesions, skin changes or Bartholin's cyst.     Left Labia: No tenderness, lesions, skin changes, Bartholin's cyst or rash.     No inguinal adenopathy present in the right or left side.     No vaginal discharge, tenderness or bleeding.      No vaginal prolapse present.     No vaginal atrophy present.       Right Adnexa: not tender, not full and no mass present.     Left Adnexa: not tender, not full and no mass present.     No cervical motion tenderness, friability, lesion or polyp.      Uterus is not enlarged or tender.      Pelvic exam was performed with patient in the lithotomy position.   Rectum:      No external hemorrhoid.   Breasts:     Right: No mass, nipple discharge, skin change or tenderness.      Left: No mass, nipple discharge, skin change or tenderness.   HENT:      Head: Normocephalic.      Nose: Nose normal.   Eyes:      Conjunctiva/sclera: Conjunctivae normal.   Neck:      Thyroid: No thyromegaly.   Cardiovascular:      Rate and Rhythm: Normal rate and regular rhythm.      Heart sounds: Normal heart sounds. No murmur heard.  Pulmonary:      Effort: Pulmonary effort is normal. No respiratory distress.      Breath sounds: Normal breath sounds. No wheezing or rales.   Abdominal:      General: There is no distension.      Palpations: Abdomen is soft. There is no mass.      Tenderness: There is no abdominal tenderness. There is no guarding or rebound.   Musculoskeletal:         General: No tenderness.      Cervical back: Neck supple. No muscular tenderness.   Lymphadenopathy:      Cervical: No cervical adenopathy.      Lower Body: No right inguinal adenopathy. No left inguinal adenopathy.   Neurological:      Mental Status: She is alert and oriented to person, place, and time.   Skin:      General: Skin is warm and dry.   Psychiatric:         Mood and Affect: Mood normal.         Behavior: Behavior normal.           Future Appointments   Date Time Provider Department Center   4/16/2024  9:15 AM Александр Moreno MD [facility]   4/22/2024  9:00 AM JEANNE Madison [facility]   4/22/2024 10:00 AM Kathy Gunderson RD [facility]   1/15/2025  9:30 AM Александр Moreno MD [facility]

## 2024-04-03 ENCOUNTER — OFFICE VISIT (OUTPATIENT)
Dept: OBGYN CLINIC | Facility: CLINIC | Age: 34
End: 2024-04-03
Payer: COMMERCIAL

## 2024-04-03 VITALS
SYSTOLIC BLOOD PRESSURE: 130 MMHG | HEART RATE: 87 BPM | HEIGHT: 63 IN | BODY MASS INDEX: 48.02 KG/M2 | DIASTOLIC BLOOD PRESSURE: 82 MMHG | OXYGEN SATURATION: 98 % | WEIGHT: 271 LBS

## 2024-04-03 DIAGNOSIS — N94.6 DYSMENORRHEA: ICD-10-CM

## 2024-04-03 DIAGNOSIS — Z30.42 ENCOUNTER FOR DEPO-PROVERA CONTRACEPTION: ICD-10-CM

## 2024-04-03 DIAGNOSIS — Z01.419 ENCNTR FOR GYN EXAM (GENERAL) (ROUTINE) W/O ABN FINDINGS: Primary | ICD-10-CM

## 2024-04-03 DIAGNOSIS — Z12.4 CERVICAL CANCER SCREENING: ICD-10-CM

## 2024-04-03 DIAGNOSIS — Z12.4 SCREENING FOR CERVICAL CANCER: ICD-10-CM

## 2024-04-03 DIAGNOSIS — N91.2 AMENORRHEA: ICD-10-CM

## 2024-04-03 DIAGNOSIS — Z11.3 SCREEN FOR STD (SEXUALLY TRANSMITTED DISEASE): ICD-10-CM

## 2024-04-03 PROCEDURE — G0145 SCR C/V CYTO,THINLAYER,RESCR: HCPCS | Performed by: OBSTETRICS & GYNECOLOGY

## 2024-04-03 PROCEDURE — S0610 ANNUAL GYNECOLOGICAL EXAMINA: HCPCS | Performed by: OBSTETRICS & GYNECOLOGY

## 2024-04-03 PROCEDURE — G0476 HPV COMBO ASSAY CA SCREEN: HCPCS | Performed by: OBSTETRICS & GYNECOLOGY

## 2024-04-03 PROCEDURE — 87591 N.GONORRHOEAE DNA AMP PROB: CPT | Performed by: OBSTETRICS & GYNECOLOGY

## 2024-04-03 PROCEDURE — 87661 TRICHOMONAS VAGINALIS AMPLIF: CPT | Performed by: OBSTETRICS & GYNECOLOGY

## 2024-04-03 PROCEDURE — 87491 CHLMYD TRACH DNA AMP PROBE: CPT | Performed by: OBSTETRICS & GYNECOLOGY

## 2024-04-03 RX ORDER — MEDROXYPROGESTERONE ACETATE 150 MG/ML
150 INJECTION, SUSPENSION INTRAMUSCULAR
Qty: 1 ML | Refills: 3 | Status: SHIPPED | OUTPATIENT
Start: 2024-04-03

## 2024-04-04 LAB
HPV HR 12 DNA CVX QL NAA+PROBE: NEGATIVE
HPV16 DNA CVX QL NAA+PROBE: NEGATIVE
HPV18 DNA CVX QL NAA+PROBE: NEGATIVE

## 2024-04-05 LAB
C TRACH DNA SPEC QL NAA+PROBE: NEGATIVE
N GONORRHOEA DNA SPEC QL NAA+PROBE: NEGATIVE
T VAGINALIS DNA SPEC QL NAA+PROBE: NEGATIVE

## 2024-04-10 LAB
LAB AP GYN PRIMARY INTERPRETATION: NORMAL
Lab: NORMAL

## 2024-04-15 ENCOUNTER — APPOINTMENT (OUTPATIENT)
Dept: LAB | Facility: HOSPITAL | Age: 34
End: 2024-04-15
Payer: COMMERCIAL

## 2024-04-15 DIAGNOSIS — Z11.3 SCREEN FOR STD (SEXUALLY TRANSMITTED DISEASE): ICD-10-CM

## 2024-04-15 DIAGNOSIS — N91.2 AMENORRHEA: ICD-10-CM

## 2024-04-15 LAB
B-HCG SERPL-ACNC: <0.6 MIU/ML (ref 0–5)
FSH SERPL-ACNC: 2.2 MIU/ML
PROLACTIN SERPL-MCNC: 26.09 NG/ML (ref 3.34–26.72)

## 2024-04-15 PROCEDURE — 83001 ASSAY OF GONADOTROPIN (FSH): CPT

## 2024-04-15 PROCEDURE — 87340 HEPATITIS B SURFACE AG IA: CPT

## 2024-04-15 PROCEDURE — 86803 HEPATITIS C AB TEST: CPT

## 2024-04-15 PROCEDURE — 87389 HIV-1 AG W/HIV-1&-2 AB AG IA: CPT

## 2024-04-15 PROCEDURE — 84146 ASSAY OF PROLACTIN: CPT

## 2024-04-15 PROCEDURE — 36415 COLL VENOUS BLD VENIPUNCTURE: CPT

## 2024-04-15 PROCEDURE — 86780 TREPONEMA PALLIDUM: CPT

## 2024-04-15 PROCEDURE — 84702 CHORIONIC GONADOTROPIN TEST: CPT

## 2024-04-16 ENCOUNTER — OFFICE VISIT (OUTPATIENT)
Dept: FAMILY MEDICINE CLINIC | Facility: CLINIC | Age: 34
End: 2024-04-16
Payer: COMMERCIAL

## 2024-04-16 VITALS
OXYGEN SATURATION: 98 % | SYSTOLIC BLOOD PRESSURE: 126 MMHG | BODY MASS INDEX: 48.09 KG/M2 | DIASTOLIC BLOOD PRESSURE: 80 MMHG | HEIGHT: 63 IN | HEART RATE: 73 BPM | WEIGHT: 271.4 LBS | TEMPERATURE: 99.4 F

## 2024-04-16 DIAGNOSIS — J30.1 SEASONAL ALLERGIC RHINITIS DUE TO POLLEN: Primary | ICD-10-CM

## 2024-04-16 DIAGNOSIS — I10 PRIMARY HYPERTENSION: ICD-10-CM

## 2024-04-16 LAB
HBV SURFACE AG SER QL: NORMAL
HCV AB SER QL: NORMAL
HIV 1+2 AB+HIV1 P24 AG SERPL QL IA: NORMAL
HIV 2 AB SERPL QL IA: NORMAL
HIV1 AB SERPL QL IA: NORMAL
HIV1 P24 AG SERPL QL IA: NORMAL
TREPONEMA PALLIDUM IGG+IGM AB [PRESENCE] IN SERUM OR PLASMA BY IMMUNOASSAY: NORMAL

## 2024-04-16 PROCEDURE — 99214 OFFICE O/P EST MOD 30 MIN: CPT | Performed by: FAMILY MEDICINE

## 2024-04-16 RX ORDER — MONTELUKAST SODIUM 10 MG/1
10 TABLET ORAL
Qty: 30 TABLET | Refills: 2 | Status: SHIPPED | OUTPATIENT
Start: 2024-04-16

## 2024-04-16 NOTE — ASSESSMENT & PLAN NOTE
New diagnosis acute symptomatic recommend the patient to continue with physical therapy milligram continue Flonase nasal spray will add the Singulair 10 mg once a day.  Reviewed recommend to do respiratory and food allergy

## 2024-04-16 NOTE — ASSESSMENT & PLAN NOTE
Chronic asymptomatic fair control will be recommended.  Indicated patient has a blood pressure been controlled without it for a while continue with the losartan /25 mg once a day low-salt diet important lose weight reviewed

## 2024-04-16 NOTE — PROGRESS NOTES
Name: Jaelyn Lyon      : 1990      MRN: [id number]  Encounter Provider: Александр Moreno MD  Encounter Date: 2024   Encounter department: [facility]    Assessment & Plan     1. Seasonal allergic rhinitis due to pollen  Assessment & Plan:  New diagnosis acute symptomatic recommend the patient to continue with physical therapy milligram continue Flonase nasal spray will add the Singulair 10 mg once a day.  Reviewed recommend to do respiratory and food allergy    Orders:  -     montelukast (SINGULAIR) 10 mg tablet; Take 1 tablet (10 mg total) by mouth daily at bedtime  -     Food Allergy Profile; Future  -     [place] Allergy Panel, Adult; Future    2. Primary hypertension  Assessment & Plan:  Chronic asymptomatic fair control will be recommended.  Indicated patient has a blood pressure been controlled without it for a while continue with the losartan /25 mg once a day low-salt diet important lose weight reviewed             Subjective      Patient here for follow up with a chronic condition patient history of hypertension she is on losartan -25 and nifedipine 60 mg patient has not been taking nifedipine for more than couple months and will monitor blood pressure and her blood pressure has been running normal she is asymptomatic she been compliant with the losartan and try to watch diet patient with history of allergy and she been taking Zyrtec 10 mg and using the nasal spray but still not helping she been having congestion postnasal drip bother her no wheezing cough      Review of Systems   Constitutional:  Negative for chills and fever.   HENT:  Positive for congestion, postnasal drip and rhinorrhea. Negative for ear pain and sore throat.    Eyes:  Negative for pain and visual disturbance.   Respiratory:  Negative for cough and shortness of breath.    Cardiovascular:  Negative for chest pain and palpitations.   Gastrointestinal:  Negative for  "abdominal pain, constipation, diarrhea and vomiting.   Genitourinary:  Negative for dysuria and hematuria.   Musculoskeletal:  Negative for arthralgias and back pain.   Skin:  Negative for color change and rash.   Neurological:  Negative for seizures and syncope.   All other systems reviewed and are negative.      Current Outpatient Medications on File Prior to Visit   Medication Sig    losartan-hydrochlorothiazide (HYZAAR) 100-25 MG per tablet Take 1 tablet by mouth daily    medroxyPROGESTERone (DEPO-PROVERA) 150 mg/mL injection Inject 1 mL (150 mg total) into a muscle every 3 (three) months    [DISCONTINUED] NIFEdipine (PROCARDIA XL) 60 mg 24 hr tablet TAKE 1 TABLET BY MOUTH EVERY DAY (Patient not taking: Reported on 3/26/2024)       Objective     /80 (BP Location: Left arm, Patient Position: Sitting)   Pulse 73   Temp 99.4 °F (37.4 °C) (Tympanic)   Ht 5' 2.5\" (1.588 m)   Wt 123 kg (271 lb 6.4 oz)   LMP 03/20/2024 (Exact Date)   SpO2 98%   BMI 48.85 kg/m²     Physical Exam  Vitals and nursing note reviewed.   Constitutional:       General: She is not in acute distress.     Appearance: She is not diaphoretic.   HENT:      Head: Normocephalic and atraumatic.      Right Ear: Tympanic membrane normal. There is no impacted cerumen.      Left Ear: Tympanic membrane normal. There is no impacted cerumen.      Nose: Congestion and rhinorrhea present.      Mouth/Throat:      Pharynx: No posterior oropharyngeal erythema.   Eyes:      General: No scleral icterus.        Right eye: No discharge.         Left eye: No discharge.   Cardiovascular:      Rate and Rhythm: Normal rate and regular rhythm.      Heart sounds: No murmur heard.  Pulmonary:      Effort: Pulmonary effort is normal. No respiratory distress.   Abdominal:      General: There is no distension.      Tenderness: There is no abdominal tenderness.   Musculoskeletal:         General: Normal range of motion.      Cervical back: Normal range of motion.    "   Right lower leg: No edema.      Left lower leg: No edema.   Skin:     Findings: No rash.   Neurological:      Mental Status: She is alert and oriented to person, place, and time.       Александр Moreno MD     Melody Vargus

## 2024-04-17 NOTE — PROGRESS NOTES
"     Assessment/Plan:    Problem List Items Addressed This Visit          Genitourinary    Dysmenorrhea     Other Visit Diagnoses       Initiation of Depo Provera    -  Primary    Relevant Medications    medroxyPROGESTERone (DEPO-PROVERA) IM injection 150 mg (Completed)    Other Relevant Orders    POCT urine HCG (Completed)    Amenorrhea        Encounter for Depo-Provera contraception        Relevant Medications    medroxyPROGESTERone (DEPO-PROVERA) IM injection 150 mg (Completed)          Counseled on lab work results including Pap smear.  STD screening was negative.  Ultrasound has not yet been performed.  Labs were ordered and reviewed with patient.  With history of adenomyosis on ultrasound as well as ovarian cyst.  Advised to continue to complete ultrasound and we will call her with pelvic ultrasound results available.  Point-of-care testing hCG will be performed today prior to initiation of Depo-Provera.  She was counseled on potential side effects from Depo-Provera including abnormal periods, amenorrhea, weight gain, some mood changes or skin changes.  Follow-up in 3 months for Depo-Provera in 1 year for annual GYN exam.    Subjective   Patient ID: Jaelyn Lyon is a 34 y.o. female.  Patient is here for a follow-up.    Patient previously seen last week for annual GYN exam.  Patient requested STD screening and was performed.  Patient has not yet completed pelvic ultrasound.  She request to start Depo-Provera contraception.    Chief Complaint   Patient presents with    Follow-up       Menstrual History:  OB History          2    Para   1    Term   1       0    AB   1    Living   1         SAB   0    IAB   1    Ectopic   0    Multiple   0    Live Births   1                  Patient's last menstrual period was 2024 (exact date).         Past Medical History:   Diagnosis Date    Abdominal pain     \"sharp at times\"    Heavy menses     Hypertension     not on meds    Irregular menses     " "Migraines     Moderate exercise     'stands and walks alot at work\"    Obesity     Teeth missing        Past Surgical History:   Procedure Laterality Date     SECTION      HERNIA REPAIR      \"age 7 approx\"    POLYPECTOMY N/A 2020    Procedure: POLYPECTOMY;  Surgeon: Mirna Riggins MD;  [facility];  Service: Gynecology    [place] HYSTEROSCOPY BX ENDOMETRIUM&/POLYPC W/WO D&C N/A 2020    Procedure: DILATATION AND CURETTAGE (D&C) WITH HYSTEROSCOPY;  Surgeon: Mirna Riggins MD;  [facility];  Service: Gynecology       Social History     Tobacco Use    Smoking status: Never     Passive exposure: Never    Smokeless tobacco: Never    Tobacco comments:     pt somkes hooka    Vaping Use    Vaping status: Some Days    Start date: 2020    Substances: Nicotine, Flavoring   Substance Use Topics    Alcohol use: Yes     Comment: social     Drug use: Never       Allergies   Allergen Reactions    Avocado - Food Allergy Throat Swelling    Latex Itching and Swelling     \"with condoms\"    Eggs Or Egg-Derived Products - Food Allergy Itching     Eggs if bought at a fast food place/regular hard boiled can eat at home    Watermelon [Citrullus Vulgaris] Itching     Of throat     I reviewed laboratory and imaging findings with patient.      Current Outpatient Medications:     losartan-hydrochlorothiazide (HYZAAR) 100-25 MG per tablet, Take 1 tablet by mouth daily, Disp: 90 tablet, Rfl: 3    medroxyPROGESTERone (DEPO-PROVERA) 150 mg/mL injection, Inject 1 mL (150 mg total) into a muscle every 3 (three) months, Disp: 1 mL, Rfl: 3    montelukast (SINGULAIR) 10 mg tablet, Take 1 tablet (10 mg total) by mouth daily at bedtime, Disp: 30 tablet, Rfl: 2  No current facility-administered medications for this visit.      Review of Systems   Constitutional: Negative.    HENT: Negative.     Eyes: Negative.    Respiratory: Negative.     Cardiovascular: Negative.    Gastrointestinal: Negative.    Endocrine: Negative.  " "  Genitourinary:         As noted in HPI   Musculoskeletal: Negative.    Skin: Negative.    Allergic/Immunologic: Negative.    Neurological: Negative.    Hematological: Negative.    Psychiatric/Behavioral: Negative.           /80   Pulse 64   Ht 5' 2.5\" (1.588 m)   Wt 124 kg (273 lb)   LMP 03/20/2024 (Exact Date)   SpO2 98%   BMI 49.14 kg/m²       Physical Exam  Constitutional:       General: She is not in acute distress.     Appearance: She is well-developed.   Cardiovascular:      Rate and Rhythm: Normal rate.      Pulses: Normal pulses.   Neurological:      Mental Status: She is alert and oriented to person, place, and time.   Skin:     General: Skin is warm and dry.   Psychiatric:         Attention and Perception: Attention normal.         Mood and Affect: Mood normal.         Speech: Speech normal.         Behavior: Behavior normal. Behavior is cooperative.         Future Appointments   Date Time Provider Department Center   4/22/2024  9:00 AM JEANNE Madison [facility]   4/22/2024 10:00 AM Kathy Gunderson RD [facility]   7/19/2024 10:30 AM [facility]TE [facility] NURSE [facility]   10/16/2024  9:30 AM Александр Moreno MD [facility] [place]   1/15/2025  9:30 AM Александр Moreno MD [facility]   4/4/2025 10:30 AM Mirna Riggins MD [facility]      "

## 2024-04-19 ENCOUNTER — OFFICE VISIT (OUTPATIENT)
Dept: OBGYN CLINIC | Facility: CLINIC | Age: 34
End: 2024-04-19
Payer: COMMERCIAL

## 2024-04-19 VITALS
DIASTOLIC BLOOD PRESSURE: 80 MMHG | WEIGHT: 273 LBS | OXYGEN SATURATION: 98 % | BODY MASS INDEX: 48.37 KG/M2 | SYSTOLIC BLOOD PRESSURE: 140 MMHG | HEIGHT: 63 IN | HEART RATE: 64 BPM

## 2024-04-19 DIAGNOSIS — Z11.3 SCREEN FOR STD (SEXUALLY TRANSMITTED DISEASE): ICD-10-CM

## 2024-04-19 DIAGNOSIS — Z30.42 ENCOUNTER FOR DEPO-PROVERA CONTRACEPTION: ICD-10-CM

## 2024-04-19 DIAGNOSIS — Z30.013 INITIATION OF DEPO PROVERA: Primary | ICD-10-CM

## 2024-04-19 DIAGNOSIS — N91.2 AMENORRHEA: ICD-10-CM

## 2024-04-19 DIAGNOSIS — N94.6 DYSMENORRHEA: ICD-10-CM

## 2024-04-19 LAB — SL AMB POCT URINE HCG: NEGATIVE

## 2024-04-19 PROCEDURE — 96372 THER/PROPH/DIAG INJ SC/IM: CPT | Performed by: OBSTETRICS & GYNECOLOGY

## 2024-04-19 PROCEDURE — 99213 OFFICE O/P EST LOW 20 MIN: CPT | Performed by: OBSTETRICS & GYNECOLOGY

## 2024-04-19 PROCEDURE — 81025 URINE PREGNANCY TEST: CPT | Performed by: OBSTETRICS & GYNECOLOGY

## 2024-04-19 RX ORDER — MEDROXYPROGESTERONE ACETATE 150 MG/ML
150 INJECTION, SUSPENSION INTRAMUSCULAR ONCE
Status: COMPLETED | OUTPATIENT
Start: 2024-04-19 | End: 2024-04-19

## 2024-04-19 RX ADMIN — MEDROXYPROGESTERONE ACETATE 150 MG: 150 INJECTION, SUSPENSION INTRAMUSCULAR at 10:53

## 2024-04-22 ENCOUNTER — TELEPHONE (OUTPATIENT)
Dept: BARIATRICS | Facility: CLINIC | Age: 34
End: 2024-04-22

## 2024-04-30 DIAGNOSIS — J30.1 SEASONAL ALLERGIC RHINITIS DUE TO POLLEN: ICD-10-CM

## 2024-05-01 RX ORDER — MONTELUKAST SODIUM 10 MG/1
10 TABLET ORAL
Qty: 90 TABLET | Refills: 1 | Status: SHIPPED | OUTPATIENT
Start: 2024-05-01

## 2024-07-19 ENCOUNTER — CLINICAL SUPPORT (OUTPATIENT)
Dept: OBGYN CLINIC | Facility: CLINIC | Age: 34
End: 2024-07-19
Payer: COMMERCIAL

## 2024-07-19 VITALS
SYSTOLIC BLOOD PRESSURE: 132 MMHG | BODY MASS INDEX: 46.92 KG/M2 | WEIGHT: 264.8 LBS | HEART RATE: 79 BPM | HEIGHT: 63 IN | DIASTOLIC BLOOD PRESSURE: 78 MMHG | OXYGEN SATURATION: 99 %

## 2024-07-19 DIAGNOSIS — Z30.42 ENCOUNTER FOR DEPO-PROVERA CONTRACEPTION: Primary | ICD-10-CM

## 2024-07-19 PROCEDURE — 96372 THER/PROPH/DIAG INJ SC/IM: CPT

## 2024-07-19 RX ORDER — MEDROXYPROGESTERONE ACETATE 150 MG/ML
150 INJECTION, SUSPENSION INTRAMUSCULAR ONCE
Status: COMPLETED | OUTPATIENT
Start: 2024-07-19 | End: 2024-07-19

## 2024-07-19 RX ADMIN — MEDROXYPROGESTERONE ACETATE 150 MG: 150 INJECTION, SUSPENSION INTRAMUSCULAR at 11:57

## 2024-07-19 NOTE — PROGRESS NOTES
Patient here today for Depo injection  Date of last injection: 4/19/2024  Depo given in: Left Deltoid  Patient tolerated injection well, no concerns.  NDC # 12617-098-89   LOT # [id number]   Exp: 03/31/2026     *pt supplied medication*

## 2024-10-03 ENCOUNTER — CLINICAL SUPPORT (OUTPATIENT)
Dept: OBGYN CLINIC | Facility: CLINIC | Age: 34
End: 2024-10-03
Payer: COMMERCIAL

## 2024-10-03 VITALS — BODY MASS INDEX: 48.56 KG/M2 | WEIGHT: 269.8 LBS

## 2024-10-03 DIAGNOSIS — Z30.42 ENCOUNTER FOR DEPO-PROVERA CONTRACEPTION: Primary | ICD-10-CM

## 2024-10-03 PROCEDURE — 96372 THER/PROPH/DIAG INJ SC/IM: CPT

## 2024-10-03 RX ORDER — MEDROXYPROGESTERONE ACETATE 150 MG/ML
150 INJECTION, SUSPENSION INTRAMUSCULAR ONCE
Status: COMPLETED | OUTPATIENT
Start: 2024-10-03 | End: 2024-10-03

## 2024-10-03 RX ADMIN — MEDROXYPROGESTERONE ACETATE 150 MG: 150 INJECTION, SUSPENSION INTRAMUSCULAR at 11:46

## 2024-10-03 NOTE — PROGRESS NOTES
Patient came in for a depo injection.     Last given: 7/19/24    Given in: right deltoid    NDC: 25650-565-45    Lot #: [id number]    Exp: 12/2026

## 2024-10-16 ENCOUNTER — OFFICE VISIT (OUTPATIENT)
Dept: FAMILY MEDICINE CLINIC | Facility: CLINIC | Age: 34
End: 2024-10-16
Payer: COMMERCIAL

## 2024-10-16 VITALS
HEART RATE: 60 BPM | SYSTOLIC BLOOD PRESSURE: 126 MMHG | TEMPERATURE: 98.3 F | HEIGHT: 63 IN | OXYGEN SATURATION: 96 % | BODY MASS INDEX: 47.63 KG/M2 | WEIGHT: 268.8 LBS | DIASTOLIC BLOOD PRESSURE: 80 MMHG

## 2024-10-16 DIAGNOSIS — Z28.21 IMMUNIZATION REFUSED: ICD-10-CM

## 2024-10-16 DIAGNOSIS — Z13.29 SCREENING FOR THYROID DISORDER: ICD-10-CM

## 2024-10-16 DIAGNOSIS — R11.0 NAUSEA: ICD-10-CM

## 2024-10-16 DIAGNOSIS — Z13.220 SCREENING, LIPID: ICD-10-CM

## 2024-10-16 DIAGNOSIS — R10.32 LLQ PAIN: Primary | ICD-10-CM

## 2024-10-16 LAB
BACTERIA UR QL AUTO: ABNORMAL /HPF
BILIRUB UR QL STRIP: NEGATIVE
CLARITY UR: CLEAR
COLOR UR: ABNORMAL
GLUCOSE UR STRIP-MCNC: NEGATIVE MG/DL
HGB UR QL STRIP.AUTO: NEGATIVE
KETONES UR STRIP-MCNC: NEGATIVE MG/DL
LEUKOCYTE ESTERASE UR QL STRIP: NEGATIVE
MUCOUS THREADS UR QL AUTO: ABNORMAL
NITRITE UR QL STRIP: NEGATIVE
NON-SQ EPI CELLS URNS QL MICRO: ABNORMAL /HPF
PH UR STRIP.AUTO: 6.5 [PH]
PROT UR STRIP-MCNC: ABNORMAL MG/DL
RBC #/AREA URNS AUTO: ABNORMAL /HPF
SP GR UR STRIP.AUTO: 1.02 (ref 1–1.03)
UROBILINOGEN UR STRIP-ACNC: <2 MG/DL
WBC #/AREA URNS AUTO: ABNORMAL /HPF

## 2024-10-16 PROCEDURE — 99214 OFFICE O/P EST MOD 30 MIN: CPT | Performed by: FAMILY MEDICINE

## 2024-10-16 PROCEDURE — 87086 URINE CULTURE/COLONY COUNT: CPT | Performed by: FAMILY MEDICINE

## 2024-10-16 PROCEDURE — 81001 URINALYSIS AUTO W/SCOPE: CPT | Performed by: FAMILY MEDICINE

## 2024-10-16 RX ORDER — ONDANSETRON 4 MG/1
4 TABLET, FILM COATED ORAL EVERY 8 HOURS PRN
Qty: 20 TABLET | Refills: 0 | Status: SHIPPED | OUTPATIENT
Start: 2024-10-16

## 2024-10-16 NOTE — ASSESSMENT & PLAN NOTE
New diagnosis symptomatic will send the urine for UA and CS plan for transvaginal ultrasound Tylenol for the pain we will follow-up with the result accordingly    Orders:    Urinalysis with microscopic; Future    Urine culture; Future    CBC and differential; Future    Comprehensive metabolic panel; Future    US pelvis complete w transvaginal; Future    Urinalysis with microscopic    Urine culture

## 2024-10-16 NOTE — PROGRESS NOTES
Ambulatory Visit  Name: Jaelyn Lyon      : 1990      MRN: [id number]  Encounter Provider: Александр Moreno MD  Encounter Date: 10/16/2024   Encounter department: [facility]      Assessment & Plan  LLQ pain  New diagnosis symptomatic will send the urine for UA and CS plan for transvaginal ultrasound Tylenol for the pain we will follow-up with the result accordingly    Orders:    Urinalysis with microscopic; Future    Urine culture; Future    CBC and differential; Future    Comprehensive metabolic panel; Future    US pelvis complete w transvaginal; Future    Urinalysis with microscopic    Urine culture    Nausea  Symptomatic recommend Zofran 4 mg proper use and possible side effects reviewed    Orders:    ondansetron (ZOFRAN) 4 mg tablet; Take 1 tablet (4 mg total) by mouth every 8 (eight) hours as needed for nausea or vomiting    CBC and differential; Future    Comprehensive metabolic panel; Future    Immunization refused  Patient declined flu shot for today       Screening, lipid    Orders:    Lipid Panel with Direct LDL reflex; Future    Screening for thyroid disorder    Orders:    TSH, 3rd generation with Free T4 reflex; Future         History of Present Illness     Patient concerned about abdomen pain in the left lower quadrant associated with nausea no vomiting denies any constipation or diarrhea no abdomen distention no blood in the stool no change in the diet no recent traveling and no fever no chills not related to certain food not related to possible      Review of Systems   Constitutional:  Negative for chills and fever.   HENT:  Negative for ear pain and sore throat.    Eyes:  Negative for pain and visual disturbance.   Respiratory:  Negative for cough and shortness of breath.    Cardiovascular:  Negative for chest pain and palpitations.   Gastrointestinal:  Positive for abdominal pain and nausea. Negative for blood in stool, constipation, diarrhea and  "vomiting.   Genitourinary:  Negative for dysuria and hematuria.   Musculoskeletal:  Negative for gait problem.   Skin:  Negative for color change and rash.   Neurological:  Negative for seizures and syncope.   All other systems reviewed and are negative.    Objective     /80 (BP Location: Left arm, Patient Position: Sitting)   Pulse 60   Temp 98.3 °F (36.8 °C) (Tympanic)   Ht 5' 2.5\" (1.588 m)   Wt 122 kg (268 lb 12.8 oz)   LMP 07/22/2024 (Approximate) Comment: heavy bleeding after her first depo shot, after that has been light spotting.  SpO2 96%   BMI 48.38 kg/m²     Physical Exam  Vitals and nursing note reviewed.   Constitutional:       General: She is not in acute distress.     Appearance: She is well-developed. She is not diaphoretic.   HENT:      Head: Normocephalic.      Right Ear: Tympanic membrane and external ear normal.      Left Ear: Tympanic membrane and external ear normal.      Nose: No rhinorrhea.      Mouth/Throat:      Pharynx: No posterior oropharyngeal erythema.   Eyes:      General:         Right eye: No discharge.         Left eye: No discharge.      Conjunctiva/sclera: Conjunctivae normal.   Neck:      Vascular: No JVD.   Cardiovascular:      Rate and Rhythm: Normal rate and regular rhythm.      Heart sounds: Normal heart sounds. No murmur heard.     No gallop.   Pulmonary:      Effort: Pulmonary effort is normal. No respiratory distress.      Breath sounds: Normal breath sounds. No wheezing.   Abdominal:      General: There is no distension.      Palpations: Abdomen is soft.      Tenderness: There is abdominal tenderness in the left lower quadrant. There is no right CVA tenderness, left CVA tenderness, guarding or rebound.   Musculoskeletal:         General: No tenderness.      Cervical back: Normal range of motion and neck supple.   Lymphadenopathy:      Cervical: No cervical adenopathy.   Skin:     General: Skin is warm.      Findings: No rash.   Neurological:      Mental " Status: She is alert and oriented to person, place, and time.         Александр Moreno MD

## 2024-10-16 NOTE — ASSESSMENT & PLAN NOTE
Symptomatic recommend Zofran 4 mg proper use and possible side effects reviewed    Orders:    ondansetron (ZOFRAN) 4 mg tablet; Take 1 tablet (4 mg total) by mouth every 8 (eight) hours as needed for nausea or vomiting    CBC and differential; Future    Comprehensive metabolic panel; Future

## 2024-10-17 LAB — BACTERIA UR CULT: NORMAL

## 2024-10-18 ENCOUNTER — TELEPHONE (OUTPATIENT)
Dept: FAMILY MEDICINE CLINIC | Facility: CLINIC | Age: 34
End: 2024-10-18

## 2024-10-18 NOTE — TELEPHONE ENCOUNTER
----- Message from Александр Moreno MD sent at 10/18/2024  2:09 PM EDT -----  Negative urine culture

## 2024-10-21 ENCOUNTER — APPOINTMENT (OUTPATIENT)
Dept: LAB | Facility: HOSPITAL | Age: 34
End: 2024-10-21
Payer: COMMERCIAL

## 2024-10-21 ENCOUNTER — HOSPITAL ENCOUNTER (OUTPATIENT)
Dept: ULTRASOUND IMAGING | Facility: HOSPITAL | Age: 34
Discharge: HOME | End: 2024-10-21
Payer: COMMERCIAL

## 2024-10-21 DIAGNOSIS — J30.1 SEASONAL ALLERGIC RHINITIS DUE TO POLLEN: ICD-10-CM

## 2024-10-21 DIAGNOSIS — R10.32 LLQ PAIN: ICD-10-CM

## 2024-10-21 DIAGNOSIS — R11.0 NAUSEA: ICD-10-CM

## 2024-10-21 DIAGNOSIS — Z13.220 SCREENING, LIPID: ICD-10-CM

## 2024-10-21 DIAGNOSIS — Z13.29 SCREENING FOR THYROID DISORDER: ICD-10-CM

## 2024-10-21 LAB
ALBUMIN SERPL BCG-MCNC: 3.9 G/DL (ref 3.5–5)
ALP SERPL-CCNC: 58 U/L (ref 34–104)
ALT SERPL W P-5'-P-CCNC: 30 U/L (ref 7–52)
ANION GAP SERPL CALCULATED.3IONS-SCNC: 4 MMOL/L (ref 4–13)
AST SERPL W P-5'-P-CCNC: 15 U/L (ref 13–39)
BASOPHILS # BLD AUTO: 0.02 THOUSANDS/ΜL (ref 0–0.1)
BASOPHILS NFR BLD AUTO: 0 % (ref 0–1)
BILIRUB SERPL-MCNC: 0.51 MG/DL (ref 0.2–1)
BUN SERPL-MCNC: 13 MG/DL (ref 5–25)
CALCIUM SERPL-MCNC: 9.4 MG/DL (ref 8.4–10.2)
CHLORIDE SERPL-SCNC: 107 MMOL/L (ref 96–108)
CHOLEST SERPL-MCNC: 161 MG/DL
CO2 SERPL-SCNC: 26 MMOL/L (ref 21–32)
CREAT SERPL-MCNC: 0.88 MG/DL (ref 0.6–1.3)
EOSINOPHIL # BLD AUTO: 0.06 THOUSAND/ΜL (ref 0–0.61)
EOSINOPHIL NFR BLD AUTO: 1 % (ref 0–6)
ERYTHROCYTE [DISTWIDTH] IN BLOOD BY AUTOMATED COUNT: 13.9 % (ref 11.6–15.1)
GFR SERPL CREATININE-BSD FRML MDRD: 85 ML/MIN/1.73SQ M
GLUCOSE P FAST SERPL-MCNC: 91 MG/DL (ref 65–99)
HCT VFR BLD AUTO: 38.5 % (ref 34.8–46.1)
HDLC SERPL-MCNC: 48 MG/DL
HGB BLD-MCNC: 12.3 G/DL (ref 11.5–15.4)
IMM GRANULOCYTES # BLD AUTO: 0.01 THOUSAND/UL (ref 0–0.2)
IMM GRANULOCYTES NFR BLD AUTO: 0 % (ref 0–2)
LDLC SERPL CALC-MCNC: 98 MG/DL (ref 0–100)
LYMPHOCYTES # BLD AUTO: 1.92 THOUSANDS/ΜL (ref 0.6–4.47)
LYMPHOCYTES NFR BLD AUTO: 29 % (ref 14–44)
MCH RBC QN AUTO: 28.7 PG (ref 26.8–34.3)
MCHC RBC AUTO-ENTMCNC: 31.9 G/DL (ref 31.4–37.4)
MCV RBC AUTO: 90 FL (ref 82–98)
MONOCYTES # BLD AUTO: 0.44 THOUSAND/ΜL (ref 0.17–1.22)
MONOCYTES NFR BLD AUTO: 7 % (ref 4–12)
NEUTROPHILS # BLD AUTO: 4.11 THOUSANDS/ΜL (ref 1.85–7.62)
NEUTS SEG NFR BLD AUTO: 63 % (ref 43–75)
NRBC BLD AUTO-RTO: 0 /100 WBCS
PLATELET # BLD AUTO: 289 THOUSANDS/UL (ref 149–390)
PMV BLD AUTO: 10.9 FL (ref 8.9–12.7)
POTASSIUM SERPL-SCNC: 4 MMOL/L (ref 3.5–5.3)
PROT SERPL-MCNC: 7 G/DL (ref 6.4–8.4)
RBC # BLD AUTO: 4.28 MILLION/UL (ref 3.81–5.12)
SODIUM SERPL-SCNC: 137 MMOL/L (ref 135–147)
TRIGL SERPL-MCNC: 73 MG/DL
TSH SERPL DL<=0.05 MIU/L-ACNC: 1.8 UIU/ML (ref 0.45–4.5)
WBC # BLD AUTO: 6.56 THOUSAND/UL (ref 4.31–10.16)

## 2024-10-21 PROCEDURE — 82785 ASSAY OF IGE: CPT

## 2024-10-21 PROCEDURE — 80061 LIPID PANEL: CPT

## 2024-10-21 PROCEDURE — 76856 US EXAM PELVIC COMPLETE: CPT

## 2024-10-21 PROCEDURE — 36415 COLL VENOUS BLD VENIPUNCTURE: CPT

## 2024-10-21 PROCEDURE — 85025 COMPLETE CBC W/AUTO DIFF WBC: CPT

## 2024-10-21 PROCEDURE — 86003 ALLG SPEC IGE CRUDE XTRC EA: CPT

## 2024-10-21 PROCEDURE — 76830 TRANSVAGINAL US NON-OB: CPT

## 2024-10-21 PROCEDURE — 80053 COMPREHEN METABOLIC PANEL: CPT

## 2024-10-21 PROCEDURE — 84443 ASSAY THYROID STIM HORMONE: CPT

## 2024-10-22 LAB
ALMOND IGE QN: 1.47 KUA/I
CASHEW NUT IGE QN: <0.1 KUA/I
CODFISH IGE QN: <0.1 KUA/I
EGG WHITE IGE QN: <0.1 KUA/I
GLUTEN IGE QN: <0.1 KUA/I
HAZELNUT IGE QN: 2.72 KUA/L
MILK IGE QN: <0.1 KUA/I
PEANUT IGE QN: <0.1 KUA/I
SALMON IGE QN: <0.1 KUA/I
SCALLOP IGE QN: <0.1 KUA/L
SESAME SEED IGE QN: <0.1 KUA/I
SHRIMP IGE QN: <0.1 KUA/L
SOYBEAN IGE QN: <0.1 KUA/I
TOTAL IGE SMQN RAST: 41.6 KU/L (ref 0–113)
TUNA IGE QN: <0.1 KUA/I
WALNUT IGE QN: <0.1 KUA/I
WHEAT IGE QN: <0.1 KUA/I

## 2024-10-23 LAB
A ALTERNATA IGE QN: <0.1 KUA/I
A FUMIGATUS IGE QN: <0.1 KUA/I
BERMUDA GRASS IGE QN: <0.1 KUA/I
BOXELDER IGE QN: 0.12 KUA/I
C HERBARUM IGE QN: <0.1 KUA/I
CAT DANDER IGE QN: <0.1 KUA/I
CMN PIGWEED IGE QN: <0.1 KUA/I
COMMON RAGWEED IGE QN: 0.42 KUA/I
COTTONWOOD IGE QN: <0.1 KUA/I
D FARINAE IGE QN: <0.1 KUA/I
D PTERONYSS IGE QN: <0.1 KUA/I
DOG DANDER IGE QN: 0.27 KUA/I
LONDON PLANE IGE QN: 0.18 KUA/I
MOUSE URINE PROT IGE QN: <0.1 KUA/I
MT JUNIPER IGE QN: <0.1 KUA/I
MUGWORT IGE QN: 1.32 KUA/I
P NOTATUM IGE QN: <0.1 KUA/I
ROACH IGE QN: <0.1 KUA/I
SHEEP SORREL IGE QN: <0.1 KUA/I
SILVER BIRCH IGE QN: 10.9 KUA/I
TIMOTHY IGE QN: <0.1 KUA/I
TOTAL IGE SMQN RAST: 35.2 KU/L (ref 0–113)
WALNUT IGE QN: 0.71 KUA/I
WHITE ASH IGE QN: <0.1 KUA/I
WHITE ELM IGE QN: 0.58 KUA/I
WHITE MULBERRY IGE QN: <0.1 KUA/I
WHITE OAK IGE QN: 10.2 KUA/I

## 2024-10-25 ENCOUNTER — TELEPHONE (OUTPATIENT)
Age: 34
End: 2024-10-25

## 2024-10-30 ENCOUNTER — OFFICE VISIT (OUTPATIENT)
Dept: FAMILY MEDICINE CLINIC | Facility: CLINIC | Age: 34
End: 2024-10-30
Payer: COMMERCIAL

## 2024-10-30 VITALS
OXYGEN SATURATION: 97 % | TEMPERATURE: 96.9 F | HEIGHT: 64 IN | DIASTOLIC BLOOD PRESSURE: 84 MMHG | SYSTOLIC BLOOD PRESSURE: 130 MMHG | HEART RATE: 67 BPM | BODY MASS INDEX: 46.61 KG/M2 | WEIGHT: 273 LBS

## 2024-10-30 DIAGNOSIS — E78.2 MIXED HYPERLIPIDEMIA: ICD-10-CM

## 2024-10-30 DIAGNOSIS — K59.09 OTHER CONSTIPATION: ICD-10-CM

## 2024-10-30 DIAGNOSIS — I10 PRIMARY HYPERTENSION: ICD-10-CM

## 2024-10-30 DIAGNOSIS — J30.1 SEASONAL ALLERGIC RHINITIS DUE TO POLLEN: Primary | ICD-10-CM

## 2024-10-30 DIAGNOSIS — D25.1 INTRAMURAL LEIOMYOMA OF UTERUS: ICD-10-CM

## 2024-10-30 PROCEDURE — 99214 OFFICE O/P EST MOD 30 MIN: CPT | Performed by: FAMILY MEDICINE

## 2024-10-30 RX ORDER — FEXOFENADINE HCL 180 MG/1
180 TABLET ORAL DAILY
Qty: 90 TABLET | Refills: 1 | Status: SHIPPED | OUTPATIENT
Start: 2024-10-30

## 2024-11-01 PROBLEM — D25.1 INTRAMURAL LEIOMYOMA OF UTERUS: Status: ACTIVE | Noted: 2024-11-01

## 2024-11-01 NOTE — ASSESSMENT & PLAN NOTE
New diagnosis finding on ultrasound of the pelvic discussed finding with the patient recommend her to be evaluated by GYN    Orders:    Ambulatory Referral to Obstetrics / Gynecology; Future

## 2024-11-01 NOTE — ASSESSMENT & PLAN NOTE
Chronic asymptomatic continue current management losartan -25 proper use and possible side effect review

## 2024-11-01 NOTE — ASSESSMENT & PLAN NOTE
Symptomatic recent allergy test results reviewed with the patient multiple seasonal allergy recommend starting fexofenadine 180 mg once a day continue Singulair 10 mg once a day refer the patient to see allergist    Orders:    fexofenadine (ALLEGRA) 180 MG tablet; Take 1 tablet (180 mg total) by mouth daily    Ambulatory Referral to Allergy; Future

## 2024-11-01 NOTE — PROGRESS NOTES
Ambulatory Visit  Name: Jaelyn Lyon      : 1990      MRN: [id number]  Encounter Provider: Александр Moreno MD  Encounter Date: 10/30/2024   Encounter department: [facility]      Assessment & Plan  Seasonal allergic rhinitis due to pollen  Symptomatic recent allergy test results reviewed with the patient multiple seasonal allergy recommend starting fexofenadine 180 mg once a day continue Singulair 10 mg once a day refer the patient to see allergist    Orders:    fexofenadine (ALLEGRA) 180 MG tablet; Take 1 tablet (180 mg total) by mouth daily    Ambulatory Referral to Allergy; Future    Primary hypertension  Chronic asymptomatic continue current management losartan -25 proper use and possible side effect review         Intramural leiomyoma of uterus  New diagnosis finding on ultrasound of the pelvic discussed finding with the patient recommend her to be evaluated by GYN    Orders:    Ambulatory Referral to Obstetrics / Gynecology; Future    Mixed hyperlipidemia  Chronic asymptomatic fair control encourage patient to continue low-fat diet         Other constipation              History of Present Illness     Patient here follow-up with a chronic condition compliant with the medication tolerated well without side effect recent blood work discussed the patient and ultrasound of the pelvic result also reviewed with the patient      Review of Systems   Constitutional:  Negative for chills and fever.   HENT:  Negative for ear pain and sore throat.    Eyes:  Negative for pain and visual disturbance.   Respiratory:  Negative for cough and shortness of breath.    Cardiovascular:  Negative for chest pain and palpitations.   Gastrointestinal:  Negative for abdominal pain, blood in stool, diarrhea and vomiting.   Genitourinary:  Negative for dysuria and hematuria.   Musculoskeletal:  Negative for gait problem.   Skin:  Negative for color change and rash.   Neurological:   "Negative for seizures and syncope.   All other systems reviewed and are negative.    Objective     /84 (BP Location: Left arm, Patient Position: Sitting)   Pulse 67   Temp (!) 96.9 °F (36.1 °C) (Tympanic)   Ht 5' 3.5\" (1.613 m)   Wt 124 kg (273 lb)   LMP 07/22/2024 (Approximate) Comment: heavy bleeding after her first depo shot, after that has been light spotting.  SpO2 97%   Breastfeeding No   BMI 47.60 kg/m²     Physical Exam  Vitals and nursing note reviewed.   Constitutional:       General: She is not in acute distress.     Appearance: She is well-developed. She is not diaphoretic.   HENT:      Head: Normocephalic.      Right Ear: Tympanic membrane and external ear normal.      Left Ear: Tympanic membrane and external ear normal.      Nose: No rhinorrhea.      Mouth/Throat:      Pharynx: No posterior oropharyngeal erythema.   Eyes:      General:         Right eye: No discharge.         Left eye: No discharge.      Conjunctiva/sclera: Conjunctivae normal.   Neck:      Vascular: No JVD.   Cardiovascular:      Rate and Rhythm: Normal rate and regular rhythm.      Heart sounds: Normal heart sounds. No murmur heard.     No gallop.   Pulmonary:      Effort: Pulmonary effort is normal. No respiratory distress.      Breath sounds: Normal breath sounds. No wheezing.   Abdominal:      General: There is no distension.      Palpations: Abdomen is soft.      Tenderness: There is no abdominal tenderness. There is no rebound.   Musculoskeletal:         General: No tenderness.      Cervical back: Normal range of motion and neck supple.   Lymphadenopathy:      Cervical: No cervical adenopathy.   Skin:     General: Skin is warm.      Findings: No rash.   Neurological:      Mental Status: She is alert and oriented to person, place, and time.         Александр Moreno MD     "

## 2024-11-08 ENCOUNTER — OFFICE VISIT (OUTPATIENT)
Dept: OBGYN CLINIC | Facility: CLINIC | Age: 34
End: 2024-11-08
Payer: COMMERCIAL

## 2024-11-08 VITALS
WEIGHT: 272.4 LBS | BODY MASS INDEX: 48.27 KG/M2 | HEIGHT: 63 IN | SYSTOLIC BLOOD PRESSURE: 118 MMHG | DIASTOLIC BLOOD PRESSURE: 78 MMHG

## 2024-11-08 DIAGNOSIS — Z01.818 PREPROCEDURAL EXAMINATION: ICD-10-CM

## 2024-11-08 DIAGNOSIS — R10.2 PELVIC PAIN: ICD-10-CM

## 2024-11-08 DIAGNOSIS — D25.1 INTRAMURAL LEIOMYOMA OF UTERUS: ICD-10-CM

## 2024-11-08 PROCEDURE — 99213 OFFICE O/P EST LOW 20 MIN: CPT | Performed by: OBSTETRICS & GYNECOLOGY

## 2024-11-08 RX ORDER — MISOPROSTOL 200 UG/1
TABLET ORAL
Qty: 2 TABLET | Refills: 0 | Status: SHIPPED | OUTPATIENT
Start: 2024-11-08

## 2024-11-08 NOTE — PROGRESS NOTES
"Assessment/Plan:  Problem List Items Addressed This Visit          Genitourinary    Intramural leiomyoma of uterus    Relevant Medications    miSOPROStol (Cytotec) 200 mcg tablet     Other Visit Diagnoses       BMI 45.0-49.9, adult (HCC)    -  Primary    Preprocedural examination        Relevant Medications    miSOPROStol (Cytotec) 200 mcg tablet    Pelvic pain                 Patient with large uterine fibroid. She has no bleeding on  Depo Provera but she is having moderate to sevre pain that has been persistent for a few weeks.    She is not interested in future fertility.  Discussed continued trial of medical therapy vs definitive surgery with a hysterectomy.  I advised endometrial biopsy to further evaluate endometrium pre-operatively prior to hysterectomy. She does have risk factors for hyperplasia.    Due to large uterus, prior CS x1 and elevated BMI (47), will refer to GYN ONC or [facility] for hysterectomy due to anticipated complex case.    Discussed risks associated with hysterectomy including risks related to bleeding, infections, wound healing problems, injury to bowel or bladder, ureter, risks from anesthesia, DVT/PE.  She will return for enodmetriAL biopsy, prepeocedure misoprostol was given.      Subjective   Patient ID: Jaelyn Lyon is a 34 y.o. female.    Patient is here for a problem visit.    Chief Complaint   Patient presents with    Consult     Patient was having pain about 2 weeks ago, and had an US, no bleeding. She is on DEPO.  She is not sexually active.  She does not desire more children.  She states the pain has been on and off.      Menstrual History:  OB History          2    Para   1    Term   1       0    AB   1    Living   1         SAB   0    IAB   1    Ectopic   0    Multiple   0    Live Births   1                  No LMP recorded (lmp unknown).         Past Medical History:   Diagnosis Date    Abdominal pain     \"sharp at times\"    Heavy menses     Hypertension     " "not on meds    Irregular menses     Migraines     Moderate exercise     'stands and walks alot at work\"    Obesity     Teeth missing        Past Surgical History:   Procedure Laterality Date     SECTION      HERNIA REPAIR      \"age 7 approx\"    POLYPECTOMY N/A 2020    Procedure: POLYPECTOMY;  Surgeon: Mirna Riggins MD;  Location: [facility];  Service: Gynecology    [facility] HYSTEROSCOPY BX ENDOMETRIUM&/POLYPC W/WO D&C N/A 2020    Procedure: DILATATION AND CURETTAGE (D&C) WITH HYSTEROSCOPY;  Surgeon: Mirna Riggins MD;  Location: [facility];  Service: Gynecology       Social History     Tobacco Use    Smoking status: Never     Passive exposure: Never    Smokeless tobacco: Never    Tobacco comments:     pt smokes hooka    Vaping Use    Vaping status: Some Days    Start date: 2020    Substances: Nicotine, Flavoring   Substance Use Topics    Alcohol use: Yes     Comment: social     Drug use: Never        Allergies   Allergen Reactions    Avocado - Food Allergy Throat Swelling    Latex Itching and Swelling     \"with condoms\"    Eggs Or Egg-Derived Products - Food Allergy Itching     Eggs if bought at a fast food place/regular hard boiled can eat at home    Watermelon [Citrullus Vulgaris] Itching     Of throat         Current Outpatient Medications:     fexofenadine (ALLEGRA) 180 MG tablet, Take 1 tablet (180 mg total) by mouth daily, Disp: 90 tablet, Rfl: 1    losartan-hydrochlorothiazide (HYZAAR) 100-25 MG per tablet, Take 1 tablet by mouth daily, Disp: 90 tablet, Rfl: 3    medroxyPROGESTERone (DEPO-PROVERA) 150 mg/mL injection, Inject 1 mL (150 mg total) into a muscle every 3 (three) months, Disp: 1 mL, Rfl: 3    miSOPROStol (Cytotec) 200 mcg tablet, Take 1 tablet orally  the night before and 1 tablet orally  the morning of the procedure, Disp: 2 tablet, Rfl: 0    montelukast (SINGULAIR) 10 mg tablet, TAKE 1 TABLET BY MOUTH DAILY AT BEDTIME, Disp: 90 tablet, Rfl: 1    ondansetron (ZOFRAN) 4 mg tablet, " "Take 1 tablet (4 mg total) by mouth every 8 (eight) hours as needed for nausea or vomiting, Disp: 20 tablet, Rfl: 0      Pertinent laboratory testing, imaging studies and prior external records reviewed    Review of Systems      /78 (BP Location: Right arm, Patient Position: Sitting, Cuff Size: Standard)   Ht 5' 3\" (1.6 m)   Wt 124 kg (272 lb 6.4 oz)   LMP  (LMP Unknown)   BMI 48.25 kg/m²       OBGyn Exam    US pelvis complete w transvaginal  Status: Final result     PACS Images     Show images for US pelvis complete w transvaginal  Study Result    Narrative & Impression   PELVIC ULTRASOUND, COMPLETE     INDICATION: The patient is 34 years old. R10.32: Left lower quadrant pain.     COMPARISON: Pelvic ultrasound 8/4/2020     TECHNIQUE: Transabdominal pelvic ultrasound was performed in sagittal and transverse planes with a curvilinear transducer. Additional transvaginal imaging was performed to better evaluate the endometrium and ovaries. Imaging included volumetric sweeps as   well as traditional still imaging technique.     FINDINGS:     UTERUS:  The uterus is retroflexed in position, measuring 11.6 x 5.0 x 5.2 cm.  Heterogeneous myometrium. Apparent 6.1 x 4.7 x 5.4 cm subserosal fibroid along the right uterine body. This may have measured up to 3.3 cm on the previous study in retrospect. Additional small fibroid along the uterine body measuring up to 1.4 cm.  The cervix appears within normal limits.     ENDOMETRIUM:  The endometrial echo complex has an AP caliber of 6.0 mm.  Appearance within normal limits.     OVARIES/ADNEXA:  Right ovary: 3.5 x 2.6 x 2.4 cm. 11.4 mL.  Ovarian Doppler flow is within normal limits.  No suspicious ovarian or adnexal abnormality.     Left ovary: 2.6 x 2.1 x 1.4 cm. 4.0 mL.  Ovarian Doppler flow is within normal limits.  No suspicious ovarian finding. 2.4 cm left paraovarian cyst suggested, better visualized on transabdominal imaging. This has decreased in size, previously " measured up to 3.1 cm.     OTHER:  Small amount of simple free fluid in the pelvis, likely physiologic in this premenopausal female.     IMPRESSION:     Probable 6.1 cm subserosal right uterine body fibroid, likely present but moderately larger than previous ultrasound in retrospect. Given interval enlargement, follow-up pelvic ultrasound in 6 to 9-months is recommended to assess size stability.     Unremarkable ovaries.     Decreased 2.4 cm left paraovarian simple cyst.     The study was marked in EPIC for significant notification and follow-up.              Workstation performed: [id number]          Future Appointments   Date Time Provider Department Center   11/18/2024  1:15 PM Mirna Riggins MD Complete  [facility]   1/15/2025  9:30 AM Александр Moreno MD [facility]   1/20/2025 11:30 AM COMPLETE WOMENS CARE NURSE Complete  [facility]   4/4/2025 10:30 AM Mirna Riggins MD Complete  [facility]

## 2024-11-18 ENCOUNTER — PROCEDURE VISIT (OUTPATIENT)
Dept: OBGYN CLINIC | Facility: CLINIC | Age: 34
End: 2024-11-18
Payer: COMMERCIAL

## 2024-11-18 VITALS — SYSTOLIC BLOOD PRESSURE: 126 MMHG | DIASTOLIC BLOOD PRESSURE: 70 MMHG

## 2024-11-18 DIAGNOSIS — Z01.818 PRE-PROCEDURAL EXAMINATION: ICD-10-CM

## 2024-11-18 DIAGNOSIS — R10.2 PELVIC PAIN: ICD-10-CM

## 2024-11-18 DIAGNOSIS — D25.1 INTRAMURAL LEIOMYOMA OF UTERUS: Primary | ICD-10-CM

## 2024-11-18 PROBLEM — R10.32 LLQ PAIN: Status: RESOLVED | Noted: 2024-10-16 | Resolved: 2024-11-18

## 2024-11-18 LAB — SL AMB POCT URINE HCG: NORMAL

## 2024-11-18 PROCEDURE — 99213 OFFICE O/P EST LOW 20 MIN: CPT | Performed by: OBSTETRICS & GYNECOLOGY

## 2024-11-18 PROCEDURE — 58100 BIOPSY OF UTERUS LINING: CPT | Performed by: OBSTETRICS & GYNECOLOGY

## 2024-11-18 PROCEDURE — 81025 URINE PREGNANCY TEST: CPT | Performed by: OBSTETRICS & GYNECOLOGY

## 2024-11-18 NOTE — PROGRESS NOTES
"Endometrial biopsy    Date/Time: 11/18/2024 1:15 PM    Performed by: Mirna Riggins MD  Authorized by: Mirna Riggins MD  Universal Protocol:  Procedure performed by:  Consent: Verbal consent obtained. Written consent obtained.  Time out: Immediately prior to procedure a \"time out\" was called to verify the correct patient, procedure, equipment, support staff and site/side marked as required.  Procedure consent: procedure consent matches procedure scheduled  Relevant documents: relevant documents present and verified  Patient identity confirmed: verbally with patient    Indication:     Indications: Other disorder of menstruation and other abnormal bleeding from female genital tract    Comments:     Procedure comments:  Multiple attempts were made to visualize the cervix clearly but was difficult due to patient anatomy.    The anterior lip of the cervix was grasped with a tenaculum but was unable to visualize the cervical os and so tenaculum was removed and endometrial biopsy was not attempted further      "

## 2024-11-18 NOTE — PROGRESS NOTES
Assessment/Plan:    Problem List Items Addressed This Visit          Genitourinary    Intramural leiomyoma of uterus - Primary    Relevant Orders    Ambulatory Referral to Obstetrics / Gynecology    US pelvis complete w transvaginal     Other Visit Diagnoses         Pelvic pain        Relevant Orders    Ambulatory Referral to Obstetrics / Gynecology    US pelvis complete w transvaginal      Pre-procedural examination        Relevant Orders    POCT urine HCG (Completed)      BMI 45.0-49.9, adult (HCC)        Relevant Orders    Ambulatory Referral to Weight Management            Endometrial biopsy was attempted but could not be performed.  This is due to poor visualization of the cervix despite multiple attempts with a small long speculum.  Patient currently does not desire hysterectomy.  Endometrial biopsy was to be performed as her preoperative evaluation.  Patient was counseled on fibroids as well as treatment options as well as nontreatment.  She prefers expectant management and to monitor to see if her periods continue to be painful.  In the meantime, if she does desire a hysterectomy, she was referred to minimally invasive GYN due to concern for potentially difficult hysterectomy due to elevated BMI, prior  and large fibroid uterus.    Her Pap smear is up-to-date.    In the meantime, she wishes to discontinue Depo-Provera due to concerns regarding weight gain.  She is currently not having heavy bleeding.  She was referred to weight management for medical weight management.    Repeat ultrasound was ordered for 3 months.    Subjective   Patient ID: Jaelyn Lyon is a 34 y.o. female.  Patient is here for a follow-up.    Chief Complaint   Patient presents with    Follow-up   Patient is here for follow-up regarding fibroids.  She is also here for endometrial biopsy.    Menstrual History:  OB History          2    Para   1    Term   1       0    AB   1    Living   1         SAB   0     "IAB   1    Ectopic   0    Multiple   0    Live Births   1                  No LMP recorded (lmp unknown).         Past Medical History:   Diagnosis Date    Abdominal pain     \"sharp at times\"    Heavy menses     Hypertension     not on meds    Irregular menses     Migraines     Moderate exercise     'stands and walks alot at work\"    Obesity     Teeth missing        Past Surgical History:   Procedure Laterality Date     SECTION      HERNIA REPAIR      \"age 7 approx\"    POLYPECTOMY N/A 2020    Procedure: POLYPECTOMY;  Surgeon: Mirna Riggins MD;  Location: [facility];  Service: Gynecology    [place] HYSTEROSCOPY BX ENDOMETRIUM&/POLYPC W/WO D&C N/A 2020    Procedure: DILATATION AND CURETTAGE (D&C) WITH HYSTEROSCOPY;  Surgeon: Mirna Riggins MD;  Location: [facility];  Service: Gynecology       Social History     Tobacco Use    Smoking status: Never     Passive exposure: Never    Smokeless tobacco: Never    Tobacco comments:     pt smokes hooka    Vaping Use    Vaping status: Some Days    Start date: 2020    Substances: Nicotine, Flavoring   Substance Use Topics    Alcohol use: Yes     Comment: social     Drug use: Never       Allergies   Allergen Reactions    Avocado - Food Allergy Throat Swelling    Latex Itching and Swelling     \"with condoms\"    Eggs Or Egg-Derived Products - Food Allergy Itching     Eggs if bought at a fast food place/regular hard boiled can eat at home    Watermelon [Citrullus Vulgaris] Itching     Of throat         Current Outpatient Medications:     fexofenadine (ALLEGRA) 180 MG tablet, Take 1 tablet (180 mg total) by mouth daily, Disp: 90 tablet, Rfl: 1    losartan-hydrochlorothiazide (HYZAAR) 100-25 MG per tablet, Take 1 tablet by mouth daily, Disp: 90 tablet, Rfl: 3    medroxyPROGESTERone (DEPO-PROVERA) 150 mg/mL injection, Inject 1 mL (150 mg total) into a muscle every 3 (three) months, Disp: 1 mL, Rfl: 3    miSOPROStol (Cytotec) 200 mcg tablet, Take 1 tablet orally  the " night before and 1 tablet orally  the morning of the procedure, Disp: 2 tablet, Rfl: 0    montelukast (SINGULAIR) 10 mg tablet, TAKE 1 TABLET BY MOUTH DAILY AT BEDTIME, Disp: 90 tablet, Rfl: 1    ondansetron (ZOFRAN) 4 mg tablet, Take 1 tablet (4 mg total) by mouth every 8 (eight) hours as needed for nausea or vomiting, Disp: 20 tablet, Rfl: 0    Pertinent laboratory testing, imaging studies and prior external records reviewed    Review of Systems   Constitutional: Negative.    HENT: Negative.     Eyes: Negative.    Respiratory: Negative.     Cardiovascular: Negative.    Gastrointestinal: Negative.    Endocrine: Negative.    Genitourinary:         As noted in HPI   Musculoskeletal: Negative.    Skin: Negative.    Allergic/Immunologic: Negative.    Neurological: Negative.    Hematological: Negative.    Psychiatric/Behavioral: Negative.           /70 (BP Location: Left arm, Patient Position: Sitting, Cuff Size: Large)   LMP  (LMP Unknown)       Physical Exam  Constitutional:       General: She is not in acute distress.     Appearance: She is well-developed.   Genitourinary:      Bladder and urethral meatus normal.      No lesions in the vagina.      Right Labia: No rash, tenderness, lesions, skin changes or Bartholin's cyst.     Left Labia: No tenderness, lesions, skin changes, Bartholin's cyst or rash.     No vaginal discharge, erythema, tenderness or bleeding.      No vaginal prolapse present.     No vaginal atrophy present.       Right Adnexa: not tender, not full and no mass present.     Left Adnexa: not tender, not full and no mass present.     No cervical polyp.      Uterus is not enlarged or tender.      No uterine mass detected.     Pelvic exam was performed with patient in the lithotomy position.   Rectum:      No tenderness.   Cardiovascular:      Rate and Rhythm: Normal rate.   Pulmonary:      Effort: Pulmonary effort is normal.   Abdominal:      General: There is no distension.      Palpations:  Abdomen is soft.      Tenderness: There is no abdominal tenderness.   Neurological:      Mental Status: She is alert and oriented to person, place, and time.   Skin:     General: Skin is warm and dry.   Psychiatric:         Behavior: Behavior normal.     Difficult to visualize cervix but palpated to be normal.      Future Appointments   Date Time Provider Department Center   1/15/2025  9:30 AM Александр Moreno MD [facility] [place]   1/20/2025 11:30 AM COMPLETE WOMENS CARE NURSE Complete  [facility]   4/4/2025 10:30 AM Mirna Riggins MD Complete  [facility]   4/16/2025 11:00 AM Oscar Abdullahi DO [facility]

## 2024-11-27 ENCOUNTER — TELEPHONE (OUTPATIENT)
Age: 34
End: 2024-11-27

## 2024-11-27 NOTE — TELEPHONE ENCOUNTER
Patient was previously being seen on the surgical side of weight management. Her last in person visit was on 10/13/2022 with Oscar Gallardo. She was in the process of bariatric surgery but then decided against it. Patient is now interested in the medical side. Please call the patient to discuss scheduling. She can be reached at [phone].

## 2024-12-03 NOTE — PROGRESS NOTES
Assessment & Plan  Class 3 obesity    Starting Zepbound 2.5 mg, increase to 5 mg after 4 weeks if tolerating well and approved     Side effects: nausea, vomiting, constipation, alopecia, fatigue and reflux  Precautions: Denies history of pancreatitis, cholelithiasis, kidney injury, suicidal behavior and ideation, diabetic retinopathy.    Contraindications: Denies Men 2 syndrome and family/personal history of medullary thyroid cancer.      Current weight: 270     Patient understands the risk and benefits of using this medication as well as the side effects.   Obesogenic Medications: Previously was on Depo     Patient has been overweight for many years.  They have tried many different ways to lose weight including various diets and exercise regimens which have been unsuccessful in the recent years. As per FDA guidelines, patient has a BMI greater than 40 associated with comorbidities which include hypertension, hyperlipidemia    Initial weight loss goal of 5-10% weight loss for improved health as studies have shown this has proven to decrease risk of obesity related conditions and co-morbid conditions and/or prevent weight-related complications. Explained the importance of making lifestyle modifications changes in conjunction with weight loss medications.    Discussed options of HealthyCORE-Intensive Lifestyle Intervention Program, Very Low Calorie Diet-VLCD, and Conservative Program and the role of weight loss medications.Patient is interested in pursuing Conservative Program.     Recommended to not skip meals.  Spoke about practicing mindful eating with frequent meals and portion control.  Recommend to avoid snacking at bedtime especially with a high carbohydrate load and to be consistent with protein intake. Recommend adequate hydration which is at least half your body weight in ounces unless medically contraindicated (ie. Systolic heart failure) with also considering GI losses and medication induced fluid loss if  "applicable. Recommend incorporating resistance training and/or strength training to help improve metabolic rate if able to tolerate.     Primary hypertension  Continue losartan hydrochlorothiazide 100-25  Mixed hyperlipidemia  Currently diet controlled  Allergies  Continue with Allegra and Singulair       Return in about 5 weeks (around 2025) for followup .       Total time spent reviewing chart, interviewing patient, examining patient, discussing plan, answering all questions, and documentin min. Most recent notes and records were reviewed.       ______________________________________________________________________        Subjective:     Chief Complaint   Patient presents with    Follow-up     MWM F/u; Waist 45in       HPI: Jaelyn Lyon  is a 34 y.o. female with past medical history of class 3 obesity, hypertension, class III obesity, seasonal allergies, hyperlipidemia   presents to the clinic for follow-up.  Patient reports that she was on Depo for the last 6 months and has gained 50 pounds since.  Patient states she recently stopped the Depo.       Diet: Rice, potatoes, fruits, Blended juices, chicken, oxtails  Skipped meals: breakfast  Hydration: 4-5 bottles a day   Exercise:Not currently but her occupation requires a lot of physical activity    Occupation: [employer]   Sleep: 4-5hours    Review Of Systems:  Constitutional:  Negative for diaphoresis.   Gastrointestinal:  Negative for abdominal pain, constipation, nausea and vomiting.   Skin:  Negative for pallor.   Psychiatric/Behavioral:  Negative for agitation, behavioral problems, confusion, dysphoric mood and hallucinations.    All other systems reviewed and are negative.     Objective:  /82 (BP Location: Left arm, Patient Position: Sitting)   Pulse 82   Temp 97.9 °F (36.6 °C) (Tympanic)   Resp 16   Ht 5' 3\" (1.6 m)   Wt 123 kg (270 lb 9.6 oz)   LMP  (LMP Unknown)   BMI 47.93 kg/m²     Wt Readings from Last 10 Encounters:   24 " "123 kg (270 lb 9.6 oz)   11/08/24 124 kg (272 lb 6.4 oz)   10/30/24 124 kg (273 lb)   10/16/24 122 kg (268 lb 12.8 oz)   10/03/24 122 kg (269 lb 12.8 oz)   07/19/24 120 kg (264 lb 12.8 oz)   04/19/24 124 kg (273 lb)   04/16/24 123 kg (271 lb 6.4 oz)   04/03/24 123 kg (271 lb)   03/26/24 121 kg (267 lb)       Physical Exam  Constitutional:       General: No acute distress.  Well-nourished  HENT:      Head: Normocephalic and atraumatic.   Eyes:      Extraocular Movements: Extraocular movements intact.      Conjunctiva/sclera: Conjunctivae normal.      Pupils: Pupils are equal, round, and reactive to light.   Cardiovascular:      Rate and Rhythm: Normal rate.   Pulmonary:      Effort: Pulmonary effort is normal.   Neurological:      General: No focal deficit present.  AO x 3     Mental Status: Alert and oriented to person, place, and time. Mental status is at baseline.   Psychiatric:         Mood and Affect: Mood normal.         Behavior: Behavior normal.     Labs and Imaging  Recent labs and imaging have been personally reviewed.    Lab Results   Component Value Date    WBC 6.56 10/21/2024    HGB 12.3 10/21/2024    HCT 38.5 10/21/2024    MCV 90 10/21/2024     10/21/2024       Lab Results   Component Value Date    SODIUM 137 10/21/2024    K 4.0 10/21/2024     10/21/2024    CO2 26 10/21/2024    AGAP 4 10/21/2024    BUN 13 10/21/2024    CREATININE 0.88 10/21/2024    GLUC 86 10/10/2019    GLUF 91 10/21/2024    CALCIUM 9.4 10/21/2024    AST 15 10/21/2024    ALT 30 10/21/2024    ALKPHOS 58 10/21/2024    TP 7.0 10/21/2024    TBILI 0.51 10/21/2024    EGFR 85 10/21/2024     No results found for: \"HGBA1C\"    Lab Results   Component Value Date    RCY0YLXXNVBA 1.805 10/21/2024    TSH 1.48 10/10/2019       Lab Results   Component Value Date    CHOLESTEROL 161 10/21/2024       Lab Results   Component Value Date    HDL 48 (L) 10/21/2024       Lab Results   Component Value Date    TRIG 73 10/21/2024       Lab Results "   Component Value Date    LDLCALC 98 10/21/2024

## 2024-12-04 ENCOUNTER — OFFICE VISIT (OUTPATIENT)
Dept: BARIATRICS | Facility: CLINIC | Age: 34
End: 2024-12-04
Payer: COMMERCIAL

## 2024-12-04 VITALS
HEART RATE: 82 BPM | WEIGHT: 270.6 LBS | TEMPERATURE: 97.9 F | DIASTOLIC BLOOD PRESSURE: 82 MMHG | SYSTOLIC BLOOD PRESSURE: 132 MMHG | HEIGHT: 63 IN | RESPIRATION RATE: 16 BRPM | BODY MASS INDEX: 47.95 KG/M2

## 2024-12-04 DIAGNOSIS — E78.2 MIXED HYPERLIPIDEMIA: ICD-10-CM

## 2024-12-04 DIAGNOSIS — E66.813 CLASS 3 OBESITY: Primary | ICD-10-CM

## 2024-12-04 DIAGNOSIS — T78.40XA ALLERGIES: ICD-10-CM

## 2024-12-04 DIAGNOSIS — I10 PRIMARY HYPERTENSION: ICD-10-CM

## 2024-12-04 PROCEDURE — 99214 OFFICE O/P EST MOD 30 MIN: CPT | Performed by: STUDENT IN AN ORGANIZED HEALTH CARE EDUCATION/TRAINING PROGRAM

## 2024-12-04 RX ORDER — TIRZEPATIDE 2.5 MG/.5ML
2.5 INJECTION, SOLUTION SUBCUTANEOUS WEEKLY
Qty: 2 ML | Refills: 0 | Status: SHIPPED | OUTPATIENT
Start: 2024-12-04 | End: 2025-01-01

## 2024-12-10 ENCOUNTER — TELEPHONE (OUTPATIENT)
Dept: BARIATRICS | Facility: CLINIC | Age: 34
End: 2024-12-10

## 2024-12-10 NOTE — TELEPHONE ENCOUNTER
PA for Zepbound 2.5 mg  EXCLUDED from plan       Reason:(Screenshot if applicable)    Plan Excluded - scan in media     Message sent to office clinical pool No

## 2024-12-10 NOTE — TELEPHONE ENCOUNTER
PA for Zepbound 2.5mg SUBMITTED to [facility]    PA Printed off [facility] Website    []CMM-KEY:    []Surescripts-Case ID #    []Availity-Auth ID #  NDC #     [x]Faxed to plan [phone]  []Other website    []Phone call Case ID #      []PA sent as URGENT    All office notes, labs and other pertaining documents and studies sent. Clinical questions answered. Awaiting determination from insurance company.     Turnaround time for your insurance to make a decision on your Prior Authorization can take 7-21 business days.

## 2025-01-08 ENCOUNTER — TELEPHONE (OUTPATIENT)
Dept: BARIATRICS | Facility: CLINIC | Age: 35
End: 2025-01-08

## 2025-01-11 DIAGNOSIS — J30.1 SEASONAL ALLERGIC RHINITIS DUE TO POLLEN: ICD-10-CM

## 2025-01-12 RX ORDER — MONTELUKAST SODIUM 10 MG/1
10 TABLET ORAL
Qty: 90 TABLET | Refills: 1 | Status: SHIPPED | OUTPATIENT
Start: 2025-01-12

## 2025-01-19 ENCOUNTER — TELEPHONE (OUTPATIENT)
Dept: FAMILY MEDICINE CLINIC | Facility: CLINIC | Age: 35
End: 2025-01-19

## 2025-04-11 ENCOUNTER — ANNUAL EXAM (OUTPATIENT)
Dept: OBGYN CLINIC | Facility: CLINIC | Age: 35
End: 2025-04-11
Payer: COMMERCIAL

## 2025-04-11 VITALS
BODY MASS INDEX: 46.99 KG/M2 | DIASTOLIC BLOOD PRESSURE: 86 MMHG | SYSTOLIC BLOOD PRESSURE: 132 MMHG | HEIGHT: 63 IN | WEIGHT: 265.2 LBS

## 2025-04-11 DIAGNOSIS — N92.6 IRREGULAR PERIODS: ICD-10-CM

## 2025-04-11 DIAGNOSIS — D21.9 FIBROID: ICD-10-CM

## 2025-04-11 DIAGNOSIS — D25.1 INTRAMURAL LEIOMYOMA OF UTERUS: ICD-10-CM

## 2025-04-11 DIAGNOSIS — Z01.419 ENCNTR FOR GYN EXAM (GENERAL) (ROUTINE) W/O ABN FINDINGS: Primary | ICD-10-CM

## 2025-04-11 PROBLEM — N93.9 ABNORMAL UTERINE BLEEDING (AUB): Status: RESOLVED | Noted: 2020-06-30 | Resolved: 2025-04-11

## 2025-04-11 PROCEDURE — S0612 ANNUAL GYNECOLOGICAL EXAMINA: HCPCS | Performed by: OBSTETRICS & GYNECOLOGY

## 2025-04-25 DIAGNOSIS — J30.1 SEASONAL ALLERGIC RHINITIS DUE TO POLLEN: ICD-10-CM

## 2025-04-25 RX ORDER — MONTELUKAST SODIUM 10 MG/1
10 TABLET ORAL
Qty: 90 TABLET | Refills: 1 | Status: SHIPPED | OUTPATIENT
Start: 2025-04-25

## 2025-05-02 ENCOUNTER — OFFICE VISIT (OUTPATIENT)
Dept: FAMILY MEDICINE CLINIC | Facility: CLINIC | Age: 35
End: 2025-05-02
Payer: COMMERCIAL

## 2025-05-02 VITALS
DIASTOLIC BLOOD PRESSURE: 98 MMHG | SYSTOLIC BLOOD PRESSURE: 150 MMHG | BODY MASS INDEX: 46.25 KG/M2 | TEMPERATURE: 98.6 F | HEART RATE: 83 BPM | HEIGHT: 63 IN | OXYGEN SATURATION: 97 % | WEIGHT: 261 LBS

## 2025-05-02 DIAGNOSIS — J30.1 SEASONAL ALLERGIC RHINITIS DUE TO POLLEN: ICD-10-CM

## 2025-05-02 DIAGNOSIS — E66.01 MORBID OBESITY WITH BMI OF 45.0-49.9, ADULT (HCC): ICD-10-CM

## 2025-05-02 DIAGNOSIS — Z00.01 ENCOUNTER FOR WELL ADULT EXAM WITH ABNORMAL FINDINGS: Primary | ICD-10-CM

## 2025-05-02 DIAGNOSIS — R22.1 LUMP IN NECK: ICD-10-CM

## 2025-05-02 DIAGNOSIS — F17.290 HOOKAH PIPE SMOKER: ICD-10-CM

## 2025-05-02 DIAGNOSIS — I10 UNCONTROLLED HYPERTENSION: ICD-10-CM

## 2025-05-02 DIAGNOSIS — E78.2 MIXED HYPERLIPIDEMIA: ICD-10-CM

## 2025-05-02 PROCEDURE — 99214 OFFICE O/P EST MOD 30 MIN: CPT | Performed by: FAMILY MEDICINE

## 2025-05-02 PROCEDURE — 99395 PREV VISIT EST AGE 18-39: CPT | Performed by: FAMILY MEDICINE

## 2025-05-02 RX ORDER — LEVOCETIRIZINE DIHYDROCHLORIDE 5 MG/1
5 TABLET, FILM COATED ORAL EVERY EVENING
Qty: 90 TABLET | Refills: 0 | Status: SHIPPED | OUTPATIENT
Start: 2025-05-02

## 2025-05-02 NOTE — ASSESSMENT & PLAN NOTE
Chronic uncontrolled today despite patient taking her medication regularly patient she had disagreement with her daughter today she will continue to monitor it at home continue current management will reevaluate in 3-week if persistent to be high to adjust medication  Orders:    CBC and differential; Future    Comprehensive metabolic panel; Future    Lipid panel; Future    TSH, 3rd generation with Free T4 reflex; Future    Vitamin D 25 hydroxy; Future

## 2025-05-02 NOTE — ASSESSMENT & PLAN NOTE
Symptomatic patient feel Allegra is not working recommend to stop Allegra start Xyzal 5 mg once a day  Orders:    levocetirizine (XYZAL) 5 MG tablet; Take 1 tablet (5 mg total) by mouth every evening

## 2025-05-02 NOTE — PROGRESS NOTES
Adult Annual Physical  Name: Jaelyn Lyon      : 1990      MRN: [id number]  Encounter Provider: Александр Moreno MD  Encounter Date: 2025   Encounter department: [facility] PRIMARY [facility]ARE    :  Assessment & Plan  Encounter for well adult exam with abnormal findings  Advice and education were given regarding nutrition, aerobic exercises, weight-bearing exercises, cardiovascular risk reduction, fall risk reduction, and age-appropriate supplements.     The patient was counseled regarding instructions for management, risk factor reductions, prognosis, risks and benefits of treatment options, patient and family education, and importance of compliance with treatment.  Patient declined tetanus shot for today       Morbid obesity with BMI of 45.0-49.9, adult ([facility])  BMI Counseling: Body mass index is 46.23 kg/m². The BMI is above normal. Nutrition recommendations include reducing portion sizes, 3-5 servings of fruits/vegetables daily, and consuming healthier snacks. Exercise recommendations include moderate aerobic physical activity for 150 minutes/week.   Patient was interested in the weight management medication insurance did not approve it she is looking to see if she can get it compound pharmacy  Orders:    CBC and differential; Future    Comprehensive metabolic panel; Future    Lipid panel; Future    TSH, 3rd generation with Free T4 reflex; Future    Vitamin D 25 hydroxy; Future    Uncontrolled hypertension  Chronic uncontrolled today despite patient taking her medication regularly patient she had disagreement with her daughter today she will continue to monitor it at home continue current management will reevaluate in 3-week if persistent to be high to adjust medication  Orders:    CBC and differential; Future    Comprehensive metabolic panel; Future    Lipid panel; Future    TSH, 3rd generation with Free T4 reflex; Future    Vitamin D 25 hydroxy; Future    Seasonal allergic rhinitis due to  pollen  Symptomatic patient feel Allegra is not working recommend to stop Allegra start Xyzal 5 mg once a day  Orders:    levocetirizine (XYZAL) 5 MG tablet; Take 1 tablet (5 mg total) by mouth every evening    Hookah pipe smoker  Well of the side effect of smoking hookah       Lump in neck  A new diagnosis on the right side of the neck palpable small lump plan for ultrasound possible cyst versus small lymph node  Orders:    US head neck soft tissue; Future    Mixed hyperlipidemia    Orders:    CBC and differential; Future    Comprehensive metabolic panel; Future    Lipid panel; Future    TSH, 3rd generation with Free T4 reflex; Future    Vitamin D 25 hydroxy; Future      Preventive Screenings:  - Diabetes Screening: screening up-to-date  - Cholesterol Screening: screening not indicated and has hyperlipidemia   - Hepatitis C screening: screening up-to-date   - HIV screening: screening up-to-date   - Cervical cancer screening: screening up-to-date   - Colon cancer screening: screening not indicated   - Lung cancer screening: screening not indicated     Immunizations:  - Immunizations due: Tdap  - Risks/benefits immunizations discussed    - The patient declines recommended vaccines currently despite my recommendations      Counseling/Anticipatory Guidance:    - Diet: discussed recommendations for a healthy/well-balanced diet.   - Exercise: the importance of regular exercise/physical activity was discussed. Recommend exercise 3-5 times per week for at least 30 minutes.     BMI Counseling: Body mass index is 46.23 kg/m². The BMI is above normal. Exercise recommendations include exercising 3-5 times per week.     Depression Screening and Follow-up Plan: Patient was screened for depression during today's encounter. They screened negative with a PHQ-2 score of 0.      Tobacco Cessation Counseling: Tobacco cessation counseling was provided. The patient is sincerely urged to quit consumption of tobacco. She is not ready to  quit tobacco.         History of Present Illness   Patient here for well exam and concerned about a lump on the right side of the neck as she noticed it couple months ago no change in the size no pain no change in the color of the skin above it no fever no change in the weight patient also concerned about her allergies been acting up and have she been using fexofenadine and is not helping   Looking at the vitals are blood pressure uncontrolled patient been taking her medication regularly she had a disagreement with her daughter before she come in and she was upset   past medical surgical family and social history reviewed    Adult Annual Physical:  Patient presents for annual physical.     Diet and Physical Activity:  - Diet/Nutrition: no special diet.  - Exercise: walking. walking at work    Depression Screening:  - PHQ-2 Score: 0    General Health:  - Sleep: sleeps well and 7-8 hours of sleep on average.  - Hearing: normal hearing bilateral ears.  - Vision: no vision problems.  - Dental: no dental visits for > 1 year, brushes teeth twice daily and does not floss.    /GYN Health:  - Follows with GYN: yes.   - Last menstrual cycle: 2/6/2025.   - History of STDs: no    Advanced Care Planning:  - Has an advanced directive?: no    - Has a durable medical POA?: no    - ACP document given to patient?: no      Review of Systems   Constitutional:  Negative for chills and fever.   HENT:  Positive for congestion, postnasal drip and rhinorrhea. Negative for ear pain and sore throat.    Eyes:  Negative for pain and visual disturbance.   Respiratory:  Negative for cough and shortness of breath.    Cardiovascular:  Negative for chest pain and palpitations.   Gastrointestinal:  Negative for abdominal pain, constipation, diarrhea and vomiting.   Genitourinary:  Negative for dysuria and hematuria.   Musculoskeletal:  Negative for arthralgias and back pain.   Skin:  Negative for color change and rash.        Lump on right side of  "neck   Neurological:  Negative for seizures and syncope.   All other systems reviewed and are negative.        Objective   /98 (BP Location: Left arm, Patient Position: Sitting)   Pulse 83   Temp 98.6 °F (37 °C) (Tympanic)   Ht 5' 3\" (1.6 m)   Wt 118 kg (261 lb)   LMP 02/06/2025   SpO2 97%   Breastfeeding No   BMI 46.23 kg/m²     Physical Exam  Vitals and nursing note reviewed.   Constitutional:       General: She is not in acute distress.     Appearance: She is well-developed. She is not diaphoretic.   HENT:      Head: Normocephalic.      Right Ear: Tympanic membrane and external ear normal.      Left Ear: Tympanic membrane and external ear normal.      Nose: Congestion and rhinorrhea present.      Mouth/Throat:      Pharynx: No posterior oropharyngeal erythema.   Eyes:      General:         Right eye: No discharge.         Left eye: No discharge.      Conjunctiva/sclera: Conjunctivae normal.   Neck:      Vascular: No JVD.     Cardiovascular:      Rate and Rhythm: Normal rate and regular rhythm.      Heart sounds: Normal heart sounds. No murmur heard.     No gallop.   Pulmonary:      Effort: Pulmonary effort is normal. No respiratory distress.      Breath sounds: Normal breath sounds. No wheezing.   Abdominal:      General: There is no distension.      Palpations: Abdomen is soft.      Tenderness: There is no abdominal tenderness. There is no rebound.   Musculoskeletal:         General: No tenderness.      Cervical back: Normal range of motion and neck supple.   Lymphadenopathy:      Cervical: No cervical adenopathy.   Skin:     General: Skin is warm.      Findings: No rash.   Neurological:      Mental Status: She is alert and oriented to person, place, and time.         "

## 2025-05-02 NOTE — PATIENT INSTRUCTIONS
"Patient Education     Routine physical for adults   The Basics   Written by the doctors and editors at [facility]   What is a physical? -- A physical is a routine visit, or \"check-up,\" with your doctor. You might also hear it called a \"wellness visit\" or \"preventive visit.\"  During each visit, the doctor will:   Ask about your physical and mental health   Ask about your habits, behaviors, and lifestyle   Do an exam   Give you vaccines if needed   Talk to you about any medicines you take   Give advice about your health   Answer your questions  Getting regular check-ups is an important part of taking care of your health. It can help your doctor find and treat any problems you have. But it's also important for preventing health problems.  A routine physical is different from a \"sick visit.\" A sick visit is when you see a doctor because of a health concern or problem. Since physicals are scheduled ahead of time, you can think about what you want to ask the doctor.  How often should I get a physical? -- It depends on your age and health. In general, for people age 21 years and older:   If you are younger than 50 years, you might be able to get a physical every 3 years.   If you are 50 years or older, your doctor might recommend a physical every year.  If you have an ongoing health condition, like diabetes or high blood pressure, your doctor will probably want to see you more often.  What happens during a physical? -- In general, each visit will include:   Physical exam - The doctor or nurse will check your height, weight, heart rate, and blood pressure. They will also look at your eyes and ears. They will ask about how you are feeling and whether you have any symptoms that bother you.   Medicines - It's a good idea to bring a list of all the medicines you take to each doctor visit. Your doctor will talk to you about your medicines and answer any questions. Tell them if you are having any side effects that bother you. You " "should also tell them if you are having trouble paying for any of your medicines.   Habits and behaviors - This includes:   Your diet   Your exercise habits   Whether you smoke, drink alcohol, or use drugs   Whether you are sexually active   Whether you feel safe at home  Your doctor will talk to you about things you can do to improve your health and lower your risk of health problems. They will also offer help and support. For example, if you want to quit smoking, they can give you advice and might prescribe medicines. If you want to improve your diet or get more physical activity, they can help you with this, too.   Lab tests, if needed - The tests you get will depend on your age and situation. For example, your doctor might want to check your:   Cholesterol   Blood sugar   Iron level   Vaccines - The recommended vaccines will depend on your age, health, and what vaccines you already had. Vaccines are very important because they can prevent certain serious or deadly infections.   Discussion of screening - \"Screening\" means checking for diseases or other health problems before they cause symptoms. Your doctor can recommend screening based on your age, risk, and preferences. This might include tests to check for:   Cancer, such as breast, prostate, cervical, ovarian, colorectal, prostate, lung, or skin cancer   Sexually transmitted infections, such as chlamydia and gonorrhea   Mental health conditions like depression and anxiety  Your doctor will talk to you about the different types of screening tests. They can help you decide which screenings to have. They can also explain what the results might mean.   Answering questions - The physical is a good time to ask the doctor or nurse questions about your health. If needed, they can refer you to other doctors or specialists, too.  Adults older than 65 years often need other care, too. As you get older, your doctor will talk to you about:   How to prevent falling at " home   Hearing or vision tests   Memory testing   How to take your medicines safely   Making sure that you have the help and support you need at home  All topics are updated as new evidence becomes available and our peer review process is complete.  This topic retrieved from [employer] on: May 02, 2024.  Topic 936500 Version 1.0  Release: 32.4.3 - C32.122  © 2024 UpToDate, Inc. and/or its affiliates. All rights reserved.  Consumer Information Use and Disclaimer   Disclaimer: This generalized information is a limited summary of diagnosis, treatment, and/or medication information. It is not meant to be comprehensive and should be used as a tool to help the user understand and/or assess potential diagnostic and treatment options. It does NOT include all information about conditions, treatments, medications, side effects, or risks that may apply to a specific patient. It is not intended to be medical advice or a substitute for the medical advice, diagnosis, or treatment of a health care provider based on the health care provider's examination and assessment of a patient's specific and unique circumstances. Patients must speak with a health care provider for complete information about their health, medical questions, and treatment options, including any risks or benefits regarding use of medications. This information does not endorse any treatments or medications as safe, effective, or approved for treating a specific patient. UpToDate, Inc. and its affiliates disclaim any warranty or liability relating to this information or the use thereof.The use of this information is governed by the Terms of Use, available at https://www.woltersCritiTechuwer.com/en/know/clinical-effectiveness-terms. 2024© UpToDate, Inc. and its affiliates and/or licensors. All rights reserved.  Copyright   © 2024 UpToDate, Inc. and/or its affiliates. All rights reserved.

## 2025-05-02 NOTE — ASSESSMENT & PLAN NOTE
BMI Counseling: Body mass index is 46.23 kg/m². The BMI is above normal. Nutrition recommendations include reducing portion sizes, 3-5 servings of fruits/vegetables daily, and consuming healthier snacks. Exercise recommendations include moderate aerobic physical activity for 150 minutes/week.   Patient was interested in the weight management medication insurance did not approve it she is looking to see if she can get it compound pharmacy  Orders:    CBC and differential; Future    Comprehensive metabolic panel; Future    Lipid panel; Future    TSH, 3rd generation with Free T4 reflex; Future    Vitamin D 25 hydroxy; Future

## 2025-05-02 NOTE — ASSESSMENT & PLAN NOTE
Advice and education were given regarding nutrition, aerobic exercises, weight-bearing exercises, cardiovascular risk reduction, fall risk reduction, and age-appropriate supplements.     The patient was counseled regarding instructions for management, risk factor reductions, prognosis, risks and benefits of treatment options, patient and family education, and importance of compliance with treatment.  Patient declined tetanus shot for today

## 2025-05-06 ENCOUNTER — APPOINTMENT (OUTPATIENT)
Dept: LAB | Facility: HOSPITAL | Age: 35
End: 2025-05-06
Payer: COMMERCIAL

## 2025-05-06 ENCOUNTER — HOSPITAL ENCOUNTER (OUTPATIENT)
Dept: ULTRASOUND IMAGING | Facility: HOSPITAL | Age: 35
Discharge: HOME | End: 2025-05-06
Attending: FAMILY MEDICINE
Payer: COMMERCIAL

## 2025-05-06 ENCOUNTER — RESULTS FOLLOW-UP (OUTPATIENT)
Age: 35
End: 2025-05-06

## 2025-05-06 DIAGNOSIS — R22.1 LUMP IN NECK: ICD-10-CM

## 2025-05-06 DIAGNOSIS — E66.01 MORBID OBESITY WITH BMI OF 45.0-49.9, ADULT (HCC): ICD-10-CM

## 2025-05-06 DIAGNOSIS — I10 UNCONTROLLED HYPERTENSION: ICD-10-CM

## 2025-05-06 DIAGNOSIS — E78.2 MIXED HYPERLIPIDEMIA: ICD-10-CM

## 2025-05-06 DIAGNOSIS — N92.6 IRREGULAR PERIODS: ICD-10-CM

## 2025-05-06 LAB
25(OH)D3 SERPL-MCNC: 15.4 NG/ML (ref 30–100)
ALBUMIN SERPL BCG-MCNC: 4.1 G/DL (ref 3.5–5)
ALP SERPL-CCNC: 68 U/L (ref 34–104)
ALT SERPL W P-5'-P-CCNC: 19 U/L (ref 7–52)
ANION GAP SERPL CALCULATED.3IONS-SCNC: 7 MMOL/L (ref 4–13)
AST SERPL W P-5'-P-CCNC: 21 U/L (ref 13–39)
B-HCG SERPL-ACNC: 0.6 MIU/ML (ref 0–5)
BASOPHILS # BLD AUTO: 0.05 THOUSANDS/ÂΜL (ref 0–0.1)
BASOPHILS NFR BLD AUTO: 1 % (ref 0–1)
BILIRUB SERPL-MCNC: 0.52 MG/DL (ref 0.2–1)
BUN SERPL-MCNC: 14 MG/DL (ref 5–25)
CALCIUM SERPL-MCNC: 9.4 MG/DL (ref 8.4–10.2)
CHLORIDE SERPL-SCNC: 106 MMOL/L (ref 96–108)
CHOLEST SERPL-MCNC: 174 MG/DL (ref ?–200)
CO2 SERPL-SCNC: 26 MMOL/L (ref 21–32)
CREAT SERPL-MCNC: 0.89 MG/DL (ref 0.6–1.3)
EOSINOPHIL # BLD AUTO: 0.27 THOUSAND/ÂΜL (ref 0–0.61)
EOSINOPHIL NFR BLD AUTO: 3 % (ref 0–6)
ERYTHROCYTE [DISTWIDTH] IN BLOOD BY AUTOMATED COUNT: 14.7 % (ref 11.6–15.1)
FSH SERPL-ACNC: 3.1 MIU/ML
GFR SERPL CREATININE-BSD FRML MDRD: 83 ML/MIN/1.73SQ M
GLUCOSE P FAST SERPL-MCNC: 68 MG/DL (ref 65–99)
HCT VFR BLD AUTO: 37.4 % (ref 34.8–46.1)
HDLC SERPL-MCNC: 56 MG/DL
HGB BLD-MCNC: 12 G/DL (ref 11.5–15.4)
IMM GRANULOCYTES # BLD AUTO: 0.02 THOUSAND/UL (ref 0–0.2)
IMM GRANULOCYTES NFR BLD AUTO: 0 % (ref 0–2)
LDLC SERPL CALC-MCNC: 109 MG/DL (ref 0–100)
LYMPHOCYTES # BLD AUTO: 2.06 THOUSANDS/ÂΜL (ref 0.6–4.47)
LYMPHOCYTES NFR BLD AUTO: 24 % (ref 14–44)
MCH RBC QN AUTO: 27.1 PG (ref 26.8–34.3)
MCHC RBC AUTO-ENTMCNC: 32.1 G/DL (ref 31.4–37.4)
MCV RBC AUTO: 85 FL (ref 82–98)
MONOCYTES # BLD AUTO: 0.48 THOUSAND/ÂΜL (ref 0.17–1.22)
MONOCYTES NFR BLD AUTO: 6 % (ref 4–12)
NEUTROPHILS # BLD AUTO: 5.8 THOUSANDS/ÂΜL (ref 1.85–7.62)
NEUTS SEG NFR BLD AUTO: 66 % (ref 43–75)
NONHDLC SERPL-MCNC: 118 MG/DL
NRBC BLD AUTO-RTO: 0 /100 WBCS
PLATELET # BLD AUTO: 286 THOUSANDS/UL (ref 149–390)
PMV BLD AUTO: 10.7 FL (ref 8.9–12.7)
POTASSIUM SERPL-SCNC: 3.7 MMOL/L (ref 3.5–5.3)
PROLACTIN SERPL-MCNC: 11.7 NG/ML (ref 3.34–26.72)
PROT SERPL-MCNC: 7.4 G/DL (ref 6.4–8.4)
RBC # BLD AUTO: 4.42 MILLION/UL (ref 3.81–5.12)
SODIUM SERPL-SCNC: 139 MMOL/L (ref 135–147)
TRIGL SERPL-MCNC: 43 MG/DL (ref ?–150)
TSH SERPL DL<=0.05 MIU/L-ACNC: 0.72 UIU/ML (ref 0.45–4.5)
WBC # BLD AUTO: 8.68 THOUSAND/UL (ref 4.31–10.16)

## 2025-05-06 PROCEDURE — 80053 COMPREHEN METABOLIC PANEL: CPT

## 2025-05-06 PROCEDURE — 76536 US EXAM OF HEAD AND NECK: CPT

## 2025-05-06 PROCEDURE — 85025 COMPLETE CBC W/AUTO DIFF WBC: CPT

## 2025-05-06 PROCEDURE — 80061 LIPID PANEL: CPT

## 2025-05-06 PROCEDURE — 82306 VITAMIN D 25 HYDROXY: CPT

## 2025-05-06 PROCEDURE — 84702 CHORIONIC GONADOTROPIN TEST: CPT

## 2025-05-06 PROCEDURE — 83001 ASSAY OF GONADOTROPIN (FSH): CPT

## 2025-05-06 PROCEDURE — 84443 ASSAY THYROID STIM HORMONE: CPT

## 2025-05-06 PROCEDURE — 84146 ASSAY OF PROLACTIN: CPT

## 2025-05-06 PROCEDURE — 36415 COLL VENOUS BLD VENIPUNCTURE: CPT

## 2025-05-07 ENCOUNTER — RESULTS FOLLOW-UP (OUTPATIENT)
Age: 35
End: 2025-05-07

## 2025-06-10 ENCOUNTER — TELEPHONE (OUTPATIENT)
Dept: FAMILY MEDICINE CLINIC | Facility: CLINIC | Age: 35
End: 2025-06-10

## 2025-06-10 ENCOUNTER — OFFICE VISIT (OUTPATIENT)
Dept: FAMILY MEDICINE CLINIC | Facility: CLINIC | Age: 35
End: 2025-06-10
Payer: COMMERCIAL

## 2025-06-10 VITALS
HEIGHT: 64 IN | TEMPERATURE: 97.6 F | BODY MASS INDEX: 44.56 KG/M2 | DIASTOLIC BLOOD PRESSURE: 90 MMHG | SYSTOLIC BLOOD PRESSURE: 188 MMHG | HEART RATE: 60 BPM | WEIGHT: 261 LBS | OXYGEN SATURATION: 97 %

## 2025-06-10 DIAGNOSIS — E66.813 CLASS 3 SEVERE OBESITY DUE TO EXCESS CALORIES WITH SERIOUS COMORBIDITY AND BODY MASS INDEX (BMI) OF 45.0 TO 49.9 IN ADULT: ICD-10-CM

## 2025-06-10 DIAGNOSIS — I10 UNCONTROLLED HYPERTENSION: Primary | ICD-10-CM

## 2025-06-10 DIAGNOSIS — E66.01 MORBID OBESITY WITH BMI OF 45.0-49.9, ADULT (HCC): ICD-10-CM

## 2025-06-10 DIAGNOSIS — K43.9 VENTRAL HERNIA WITHOUT OBSTRUCTION OR GANGRENE: ICD-10-CM

## 2025-06-10 PROCEDURE — 99214 OFFICE O/P EST MOD 30 MIN: CPT | Performed by: FAMILY MEDICINE

## 2025-06-10 RX ORDER — VALSARTAN AND HYDROCHLOROTHIAZIDE 160; 25 MG/1; MG/1
1 TABLET ORAL DAILY
Qty: 30 TABLET | Refills: 5 | Status: SHIPPED | OUTPATIENT
Start: 2025-06-10

## 2025-06-10 RX ORDER — TIRZEPATIDE 2.5 MG/.5ML
2.5 INJECTION, SOLUTION SUBCUTANEOUS WEEKLY
Refills: 0 | OUTPATIENT
Start: 2025-06-10 | End: 2025-07-08

## 2025-06-10 RX ORDER — NIFEDIPINE 30 MG/1
30 TABLET, EXTENDED RELEASE ORAL DAILY
Qty: 30 TABLET | Refills: 5 | Status: SHIPPED | OUTPATIENT
Start: 2025-06-10 | End: 2025-12-07

## 2025-06-10 RX ORDER — TIRZEPATIDE 2.5 MG/.5ML
2.5 INJECTION, SOLUTION SUBCUTANEOUS WEEKLY
Qty: 2 ML | Refills: 0 | Status: SHIPPED | OUTPATIENT
Start: 2025-06-10 | End: 2025-07-08

## 2025-06-10 NOTE — PROGRESS NOTES
Name: Jaelyn Lyon      : 1990      MRN: [id number]  Encounter Provider: Александр Moreno MD  Encounter Date: 6/10/2025   Encounter department: [facility]  :  Assessment & Plan  Uncontrolled hypertension  Uncontrolled asymptomatic recommended to stop losartan HCT  Started Diovan -25 mg once a day to taking in the morning recommend start nifedipine 30 mg once a day low-salt diet important lose weight reviewed will reevaluate 3-week  Orders:  •  valsartan-hydrochlorothiazide (DIOVAN-HCT) 160-25 MG per tablet; Take 1 tablet by mouth daily  •  NIFEdipine (PROCARDIA XL) 30 mg 24 hr tablet; Take 1 tablet (30 mg total) by mouth daily    Morbid obesity with BMI of 45.0-49.9, adult (HCC)  Prior Authorization Clinical Questions for Weight Management Pharmacotherapy    1. Does the patient have a contrainidcation to medication prescribed for weight management?: No  2. Does the patient have a diagnosis of obesity, confirmed by a BMI greater than or equal to 30 kg/m^2?: Yes  3. Does the patient have a BMI of greater than or equal to 27 kg/m^2 with at least one weight-related comorbidity/risk factor/complication (e.g. diabetes, dyslipidemia, coronary artery disease)?: Yes  4. Weight-related co-morbidities/risk factors: dyslipidemia, hypertension  5. WEGOVY CVA Indication: Does patient have established documented cardiovascular disease (history of a prior heart attack (myocardial infarction), stroke, or symptomatic peripheral arterial disease (PAD)?: No  6. ZEPBOUND ARIANA Indication: Does patient have documented ARIANA diagnosed via sleep study (insurance will require copy of sleep study results for approval)?: No  7. Has the patient been on a weight loss regimen of low-calorie diet, increased physical activity, and lifestyle modifications for a minimum of 6 months?: Yes  8. Has the patient completed a comprehensive weight loss program (ie, Weight Watchers, Noom, Bariatrics, other angela on  phone)? If so, what?: Yes  9. Does the patient have a history of type 2 diabetes?: No  10. Has the member tried and failed other weight loss medication within the past 12 months?: No  11. Will the member use requested medication in combination with another GLP agonist or weight loss drug?: No  12. Is the medication a controlled substance?: No     Baseline weight (in pounds): 261 lbs  Current weight (in pounds): 261 lbs  Weight loss percentage: 0%     Chronic uncontrolled patient trying to lose weight different diet different exercise is not helping patient blood pressure uncontrolled and obesity increase the risk of hypotension discussed with the patient  medication for weight management she agreed discussed Zepbound proper use benefit versus side effect and the proper titration  Will start with 2.5 mg injection weekly demonstrated patient how to inject it       Ventral hernia without obstruction or gangrene  New diagnosis discussed with patient natural of the problem recommend avoid heavy lifting discussed important lose weight       Class 3 severe obesity due to excess calories with serious comorbidity and body mass index (BMI) of 45.0 to 49.9 in adult      Orders:  •  tirzepatide (Zepbound) 2.5 mg/0.5 mL auto-injector; Inject 0.5 mL (2.5 mg total) under the skin once a week for 28 days      Assessment & Plan           History of Present Illness   Patient today concerned about bulging in her abdomen wall with her job she requires heavy lifting and she is concerned about hernia no pain noticed small bulging looking at the vital signs blood pressure uncontrolled despite patient been taking her losartan HCT daily denies any chest pain short of breath no palpitation no lower extremity edema no blurred vision no double vision no numbness      Review of Systems   Constitutional:  Negative for activity change, appetite change, fatigue and fever.   HENT:  Negative for congestion, ear pain, sinus pressure, sinus pain and  "sore throat.    Eyes:  Negative for discharge and redness.   Respiratory:  Negative for cough, chest tightness and shortness of breath.    Cardiovascular:  Negative for chest pain, palpitations and leg swelling.   Gastrointestinal:  Negative for abdominal pain, constipation and diarrhea.   Genitourinary:  Negative for dysuria, flank pain, frequency and hematuria.   Musculoskeletal:  Negative for gait problem.   Skin:  Negative for pallor and rash.   Neurological:  Negative for dizziness, tremors, weakness, numbness and headaches.   Hematological:  Does not bruise/bleed easily.       Objective   BP (!) 188/90   Pulse 60   Temp 97.6 °F (36.4 °C) (Tympanic)   Ht 5' 3.78\" (1.62 m)   Wt 118 kg (261 lb)   SpO2 97%   BMI 45.11 kg/m²      Physical Exam  Vitals and nursing note reviewed.   Constitutional:       General: She is not in acute distress.     Appearance: She is well-developed. She is not diaphoretic.   HENT:      Head: Normocephalic.      Right Ear: Tympanic membrane and external ear normal.      Left Ear: Tympanic membrane and external ear normal.      Nose: No rhinorrhea.      Mouth/Throat:      Pharynx: No posterior oropharyngeal erythema.     Eyes:      General:         Right eye: No discharge.         Left eye: No discharge.      Conjunctiva/sclera: Conjunctivae normal.     Neck:      Vascular: No JVD.     Cardiovascular:      Rate and Rhythm: Normal rate and regular rhythm.      Heart sounds: Normal heart sounds. No murmur heard.     No gallop.   Pulmonary:      Effort: Pulmonary effort is normal. No respiratory distress.      Breath sounds: Normal breath sounds. No wheezing.   Abdominal:      General: There is no distension.      Palpations: Abdomen is soft.      Tenderness: There is no abdominal tenderness. There is no rebound.      Hernia: A hernia is present. Hernia is present in the ventral area.     Musculoskeletal:         General: No tenderness.      Cervical back: Normal range of motion and " neck supple.   Lymphadenopathy:      Cervical: No cervical adenopathy.     Skin:     General: Skin is warm.      Findings: No rash.     Neurological:      Mental Status: She is alert and oriented to person, place, and time.

## 2025-06-10 NOTE — ASSESSMENT & PLAN NOTE
New diagnosis discussed with patient natural of the problem recommend avoid heavy lifting discussed important lose weight

## 2025-06-10 NOTE — ASSESSMENT & PLAN NOTE
Uncontrolled asymptomatic recommended to stop losartan HCT  Started Diovan -25 mg once a day to taking in the morning recommend start nifedipine 30 mg once a day low-salt diet important lose weight reviewed will reevaluate 3-week  Orders:  •  valsartan-hydrochlorothiazide (DIOVAN-HCT) 160-25 MG per tablet; Take 1 tablet by mouth daily  •  NIFEdipine (PROCARDIA XL) 30 mg 24 hr tablet; Take 1 tablet (30 mg total) by mouth daily

## 2025-06-10 NOTE — TELEPHONE ENCOUNTER
Reason for call:   [x] Prior Auth  [] Other:     Caller:  [] Patient  [x] Pharmacy  Name: [facility]   Address: [address] [facility]   Callback Number: [phone]    Medication: Zepbound     Dose/Frequency: 2.5 mg/0.5 mL auto-injector. Inject 0.5 mL under the skin once a week for 28 days     Quantity: 2 mL     Ordering Provider:   [x] PCP/Provider -   [] Speciality/Provider -     Has the patient tried other medications and failed? If failed, which medications did they fail?    [] No   [] Yes -     Is the patient's insurance updated in EPIC?   [] Yes   [] No     Is a copy of the patient's insurance scanned in EPIC?   [] Yes   [] No

## 2025-06-10 NOTE — ASSESSMENT & PLAN NOTE
Prior Authorization Clinical Questions for Weight Management Pharmacotherapy    1. Does the patient have a contrainidcation to medication prescribed for weight management?: No  2. Does the patient have a diagnosis of obesity, confirmed by a BMI greater than or equal to 30 kg/m^2?: Yes  3. Does the patient have a BMI of greater than or equal to 27 kg/m^2 with at least one weight-related comorbidity/risk factor/complication (e.g. diabetes, dyslipidemia, coronary artery disease)?: Yes  4. Weight-related co-morbidities/risk factors: dyslipidemia, hypertension  5. WEGOVY CVA Indication: Does patient have established documented cardiovascular disease (history of a prior heart attack (myocardial infarction), stroke, or symptomatic peripheral arterial disease (PAD)?: No  6. ZEPBOUND ARIANA Indication: Does patient have documented ARIANA diagnosed via sleep study (insurance will require copy of sleep study results for approval)?: No  7. Has the patient been on a weight loss regimen of low-calorie diet, increased physical activity, and lifestyle modifications for a minimum of 6 months?: Yes  8. Has the patient completed a comprehensive weight loss program (ie, Weight Watchers, Noom, Bariatrics, other angela on phone)? If so, what?: Yes  9. Does the patient have a history of type 2 diabetes?: No  10. Has the member tried and failed other weight loss medication within the past 12 months?: No  11. Will the member use requested medication in combination with another GLP agonist or weight loss drug?: No  12. Is the medication a controlled substance?: No     Baseline weight (in pounds): 261 lbs  Current weight (in pounds): 261 lbs  Weight loss percentage: 0%     Chronic uncontrolled patient trying to lose weight different diet different exercise is not helping patient blood pressure uncontrolled and obesity increase the risk of hypotension discussed with the patient  medication for weight management she agreed discussed Zepbound proper use  benefit versus side effect and the proper titration  Will start with 2.5 mg injection weekly demonstrated patient how to inject it

## 2025-06-11 NOTE — TELEPHONE ENCOUNTER
PA for Zepbound 2.5mg/0.5ml EXCLUDED from plan       Reason:      Message sent to office clinical pool Yes

## 2025-06-11 NOTE — TELEPHONE ENCOUNTER
PA for Zepbound 2.5mg/0.5mL SUBMITTED to [employer]    via    [x]CMM-KEY: [id number]  []Surescripts-Case ID #   []Availity-Auth ID # NDC #   []Faxed to plan   []Other website   []Phone call Case ID #     [x]PA sent as URGENT    All office notes, labs and other pertaining documents and studies sent. Clinical questions answered. Awaiting determination from insurance company.     Turnaround time for your insurance to make a decision on your Prior Authorization can take 7-21 business days.

## 2025-07-24 DIAGNOSIS — I10 UNCONTROLLED HYPERTENSION: ICD-10-CM

## 2025-07-25 RX ORDER — NIFEDIPINE 30 MG/1
30 TABLET, EXTENDED RELEASE ORAL DAILY
Qty: 90 TABLET | Refills: 1 | Status: SHIPPED | OUTPATIENT
Start: 2025-07-25

## 2025-07-25 RX ORDER — VALSARTAN AND HYDROCHLOROTHIAZIDE 160; 25 MG/1; MG/1
1 TABLET ORAL DAILY
Qty: 90 TABLET | Refills: 1 | Status: SHIPPED | OUTPATIENT
Start: 2025-07-25

## 2025-08-04 ENCOUNTER — OFFICE VISIT (OUTPATIENT)
Dept: FAMILY MEDICINE CLINIC | Facility: CLINIC | Age: 35
End: 2025-08-04
Payer: COMMERCIAL

## 2025-08-04 VITALS
BODY MASS INDEX: 45.75 KG/M2 | RESPIRATION RATE: 19 BRPM | WEIGHT: 268 LBS | DIASTOLIC BLOOD PRESSURE: 96 MMHG | HEART RATE: 82 BPM | HEIGHT: 64 IN | TEMPERATURE: 98.3 F | OXYGEN SATURATION: 97 % | SYSTOLIC BLOOD PRESSURE: 158 MMHG

## 2025-08-04 DIAGNOSIS — I10 UNCONTROLLED HYPERTENSION: Primary | ICD-10-CM

## 2025-08-04 PROCEDURE — 99214 OFFICE O/P EST MOD 30 MIN: CPT | Performed by: FAMILY MEDICINE

## 2025-08-04 RX ORDER — NIFEDIPINE 60 MG/1
60 TABLET, EXTENDED RELEASE ORAL DAILY
Qty: 30 TABLET | Refills: 5 | Status: SHIPPED | OUTPATIENT
Start: 2025-08-04 | End: 2026-01-31

## (undated) DEVICE — ASTOUND STANDARD SURGICAL GOWN, XL: Brand: CONVERTORS

## (undated) DEVICE — MYOSURE SEAL SET

## (undated) DEVICE — UNDER BUTTOCKS DRAPE W/FLUID CONTROL POUCH: Brand: CONVERTORS

## (undated) DEVICE — PREMIUM DRY TRAY LF: Brand: MEDLINE INDUSTRIES, INC.

## (undated) DEVICE — GLOVE INDICATOR PI UNDERGLOVE SZ 6.5 BLUE

## (undated) DEVICE — STERILE 8 INCH PROCTO SWAB: Brand: CARDINAL HEALTH

## (undated) DEVICE — 2000CC GUARDIAN II: Brand: GUARDIAN

## (undated) DEVICE — DRAPE EQUIPMENT RF WAND

## (undated) DEVICE — GLOVE INDICATOR PI UNDERGLOVE SZ 7.5 BLUE

## (undated) DEVICE — PACK FLUENT DISP

## (undated) DEVICE — PVC URETHRAL CATHETER: Brand: DOVER

## (undated) DEVICE — GLOVE PI ULTRA TOUCH SZ.7.5

## (undated) DEVICE — SCD SEQUENTIAL COMPRESSION COMFORT SLEEVE MEDIUM KNEE LENGTH: Brand: KENDALL SCD

## (undated) DEVICE — GLOVE PI ULTRA TOUCH SZ.8.0

## (undated) DEVICE — STRL ALLENTOWN HYSTEROSCOPY PK: Brand: CARDINAL HEALTH

## (undated) DEVICE — DEVICE MYOSURE LITE TISSUE REMOVAL HYSTEROSCOPIC

## (undated) DEVICE — GLOVE PI ULTRA TOUCH SZ.6.5